# Patient Record
Sex: FEMALE | Race: WHITE | Employment: FULL TIME | URBAN - METROPOLITAN AREA
[De-identification: names, ages, dates, MRNs, and addresses within clinical notes are randomized per-mention and may not be internally consistent; named-entity substitution may affect disease eponyms.]

---

## 2012-08-30 LAB — PAP-ABSTRACT: NORMAL

## 2013-09-20 LAB — PAP-ABSTRACT: NORMAL

## 2014-09-26 LAB — PAP-ABSTRACT: NORMAL

## 2017-01-03 ENCOUNTER — TELEPHONE (OUTPATIENT)
Dept: NURSING | Facility: CLINIC | Age: 47
End: 2017-01-03

## 2017-01-03 NOTE — TELEPHONE ENCOUNTER
"Pt had surgery on 12/27/16: \"Diagnostic laparoscopy, laparoscopic-assisted vaginal hysterectomy, bilateral salpingo-oophorectomy, enterocele repair, fulguration of posterior cul-de-sac endometriosis.\" Pt was told to call in today to report how she was feeling. Pt states she is feeling great expect that her breasts are very tender. Pt had urine test by her doctor in [place] that showed high levels of estrogen but low levels in the blood. Pt is wondering if she should decrease her hormone dosing or if Dr. Churchill had a different recommendation. Note routed to Dr. Churchill to review and advise.   "

## 2017-01-03 NOTE — TELEPHONE ENCOUNTER
The patient is doing well postoperatively. She actually had some breast tenderness even before the surgery and we have asked her to decrease her estrogen dose. We will see her in return in 3 months. She does live in [place] and will see her private physician. Hemoglobin follow-up. Her pathology report being benign and was relayed to her.

## 2017-02-20 DIAGNOSIS — N91.2 ABSENCE OF MENSTRUATION: Primary | ICD-10-CM

## 2017-02-20 NOTE — TELEPHONE ENCOUNTER
12/27/16 Diagnostic laparoscopy, laparoscopic-assisted vaginal hysterectomy, bilateral salpingo-oophorectomy, enterocele repair, fulguration of posterior cul-de-sac endometriosis. Pt is currently living in [place] and wants to know if she really needs to come to the clinic for a 3 month follow-up. Pt stated incision sites are clean and intact, she is not having any vaginal bleeding or pain. Is not taking pain medication. Pt would also like to have a Rx for Estradiol patches.. She is requesting 3 month with 3 refills. Pt stated she was instructed to cut patches in half because of breast tenderness. Informed pt that she does need to come for follow-up. Pt wants message sent to Dr. Churchill to see if that is really necessary.Pt informed that Dr. Churchill is out of the office until next week. OK with waiting until next week. Routing to Dr. Churchill. Please advise and OK to send RX

## 2017-03-02 RX ORDER — ESTRADIOL 0.05 MG/D
0.5 PATCH, EXTENDED RELEASE TRANSDERMAL
Qty: 12 PATCH | Refills: 3 | Status: SHIPPED | OUTPATIENT
Start: 2017-03-02 | End: 2017-12-14

## 2017-03-02 NOTE — TELEPHONE ENCOUNTER
Patient is instructed to call and tell me how she is doing she does not need to come all the way from [place] for 3 month check.

## 2017-03-06 ENCOUNTER — TELEPHONE (OUTPATIENT)
Dept: OBGYN | Facility: CLINIC | Age: 47
End: 2017-03-06

## 2017-03-06 NOTE — TELEPHONE ENCOUNTER
[facility] called with verification on dosage for Estradial. Please call Odessa from [employer] at [phone]. Ref# [id number]

## 2017-03-06 NOTE — TELEPHONE ENCOUNTER
"Ref # is actually: [id number]. Kate from [facility] stated the patch should not be cut in half and wanted to clarify the sig: \"Place 0.5 patches onto the skin twice a week On Monday and Friday.\" Note routed to Floridalma Scanlon to review and advise.   "

## 2017-12-14 ENCOUNTER — RADIANT APPOINTMENT (OUTPATIENT)
Dept: MAMMOGRAPHY | Facility: CLINIC | Age: 47
End: 2017-12-14
Payer: COMMERCIAL

## 2017-12-14 ENCOUNTER — OFFICE VISIT (OUTPATIENT)
Dept: OBGYN | Facility: CLINIC | Age: 47
End: 2017-12-14
Payer: COMMERCIAL

## 2017-12-14 ENCOUNTER — RESULT FOLLOW UP (OUTPATIENT)
Dept: OBGYN | Facility: CLINIC | Age: 47
End: 2017-12-14

## 2017-12-14 VITALS
BODY MASS INDEX: 27.46 KG/M2 | HEIGHT: 63 IN | WEIGHT: 155 LBS | DIASTOLIC BLOOD PRESSURE: 64 MMHG | SYSTOLIC BLOOD PRESSURE: 96 MMHG

## 2017-12-14 DIAGNOSIS — Z90.710 HISTORY OF HYSTERECTOMY INCLUDING CERVIX: ICD-10-CM

## 2017-12-14 DIAGNOSIS — N91.2 ABSENCE OF MENSTRUATION: ICD-10-CM

## 2017-12-14 DIAGNOSIS — Z01.419 ENCOUNTER FOR GYNECOLOGICAL EXAMINATION WITHOUT ABNORMAL FINDING: Primary | ICD-10-CM

## 2017-12-14 DIAGNOSIS — Z12.31 VISIT FOR SCREENING MAMMOGRAM: ICD-10-CM

## 2017-12-14 PROCEDURE — 87624 HPV HI-RISK TYP POOLED RSLT: CPT | Performed by: OBSTETRICS & GYNECOLOGY

## 2017-12-14 PROCEDURE — 77063 BREAST TOMOSYNTHESIS BI: CPT | Mod: TC

## 2017-12-14 PROCEDURE — G0145 SCR C/V CYTO,THINLAYER,RESCR: HCPCS | Performed by: OBSTETRICS & GYNECOLOGY

## 2017-12-14 PROCEDURE — G0124 SCREEN C/V THIN LAYER BY MD: HCPCS | Performed by: OBSTETRICS & GYNECOLOGY

## 2017-12-14 PROCEDURE — 99396 PREV VISIT EST AGE 40-64: CPT | Performed by: OBSTETRICS & GYNECOLOGY

## 2017-12-14 PROCEDURE — G0202 SCR MAMMO BI INCL CAD: HCPCS | Mod: TC

## 2017-12-14 RX ORDER — LIOTHYRONINE SODIUM 5 UG/1
TABLET ORAL
Refills: 1 | COMMUNITY
Start: 2017-11-27 | End: 2019-09-16

## 2017-12-14 RX ORDER — ATOVAQUONE AND PROGUANIL HYDROCHLORIDE 250; 100 MG/1; MG/1
TABLET, FILM COATED ORAL
Refills: 4 | COMMUNITY
Start: 2017-12-02 | End: 2018-06-14

## 2017-12-14 RX ORDER — AZITHROMYCIN 500 MG/1
500 TABLET, FILM COATED ORAL DAILY
COMMUNITY
End: 2018-06-14

## 2017-12-14 RX ORDER — ESZOPICLONE 3 MG/1
TABLET, FILM COATED ORAL
Refills: 4 | COMMUNITY
Start: 2017-11-22 | End: 2017-12-14

## 2017-12-14 RX ORDER — ESTRADIOL 0.05 MG/D
0.5 PATCH, EXTENDED RELEASE TRANSDERMAL
Qty: 12 PATCH | Refills: 3 | Status: SHIPPED | OUTPATIENT
Start: 2017-12-14

## 2017-12-14 RX ORDER — AZITHROMYCIN 500 MG/1
TABLET, FILM COATED ORAL
Refills: 2 | COMMUNITY
Start: 2017-12-04 | End: 2017-12-14

## 2017-12-14 ASSESSMENT — ANXIETY QUESTIONNAIRES
1. FEELING NERVOUS, ANXIOUS, OR ON EDGE: NOT AT ALL
3. WORRYING TOO MUCH ABOUT DIFFERENT THINGS: NOT AT ALL
6. BECOMING EASILY ANNOYED OR IRRITABLE: NOT AT ALL
5. BEING SO RESTLESS THAT IT IS HARD TO SIT STILL: MORE THAN HALF THE DAYS
IF YOU CHECKED OFF ANY PROBLEMS ON THIS QUESTIONNAIRE, HOW DIFFICULT HAVE THESE PROBLEMS MADE IT FOR YOU TO DO YOUR WORK, TAKE CARE OF THINGS AT HOME, OR GET ALONG WITH OTHER PEOPLE: SOMEWHAT DIFFICULT
2. NOT BEING ABLE TO STOP OR CONTROL WORRYING: NOT AT ALL
GAD7 TOTAL SCORE: 5
7. FEELING AFRAID AS IF SOMETHING AWFUL MIGHT HAPPEN: NOT AT ALL

## 2017-12-14 ASSESSMENT — PATIENT HEALTH QUESTIONNAIRE - PHQ9
5. POOR APPETITE OR OVEREATING: NEARLY EVERY DAY
SUM OF ALL RESPONSES TO PHQ QUESTIONS 1-9: 7

## 2017-12-14 NOTE — MR AVS SNAPSHOT
"              After Visit Summary   2017    Cori Gregory    MRN: [id number]           Patient Information     Date Of Birth          1970        Visit Information        Provider Department      2017 3:15 PM Francisco Churchill MD [facility]        Today's Diagnoses     Encounter for gynecological examination without abnormal finding    -  1    Absence of menstruation           Follow-ups after your visit        Who to contact     If you have questions or need follow up information about today's clinic visit or your schedule please contact [facility] directly at [phone].  Normal or non-critical lab and imaging results will be communicated to you by [employer], letter or phone within 4 business days after the clinic has received the results. If you do not hear from us within 7 days, please contact the clinic through [employer] or phone. If you have a critical or abnormal lab result, we will notify you by phone as soon as possible.  Submit refill requests through [employer] or call your pharmacy and they will forward the refill request to us. Please allow 3 business days for your refill to be completed.          Additional Information About Your Visit        MyChart Information     [employer] lets you send messages to your doctor, view your test results, renew your prescriptions, schedule appointments and more. To sign up, go to [email or web address] . Click on \"Log in\" on the left side of the screen, which will take you to the Welcome page. Then click on \"Sign up Now\" on the right side of the page.     You will be asked to enter the access code listed below, as well as some personal information. Please follow the directions to create your username and password.     Your access code is: [email or web address]  Expires: 3/14/2018  4:05 PM     Your access code will  in 90 days. If you need help or a new code, please call your [place] clinic or [phone].      " "  Care EveryWhere ID     This is your Care EveryWhere ID. This could be used by other organizations to access your [facility] medical records  [id number]        Your Vitals Were     Height Breastfeeding? BMI (Body Mass Index)             5' 3\" (1.6 m) No 27.46 kg/m2          Blood Pressure from Last 3 Encounters:   12/14/17 96/64   12/28/16 109/77   12/05/16 98/60    Weight from Last 3 Encounters:   12/14/17 155 lb (70.3 kg)   12/27/16 153 lb 11.2 oz (69.7 kg)   12/05/16 148 lb (67.1 kg)              We Performed the Following     HPV High Risk Types DNA Cervical     Pap imaged thin layer screen with HPV - recommended age 30 - 65          Where to get your medicines      These medications were sent to [id number] - [place], [place] - [address] AT [facility]  [address], [place] [facility] [address] [address]     Phone:  [phone]     estradiol 0.05 MG/24HR BIW patch          Primary Care Provider Office Phone # Fax #    Bahvana Che [phone]       [facility]           [address] [address]        Equal Access to Services     PARAG KELLY AH: Hadii aad ku hadasho Soomaali, waaxda luqadaha, qaybta kaalmada adeegyada, waxay idiin hayaan ofe duarte la'jenn ah. So [facility] [phone].    ATENCIÓN: Si habla español, tiene a nieves disposición servicios gratuitos de asistencia lingüística. Llame al [phone].    We comply with applicable federal civil rights laws and [place] laws. We do not discriminate on the basis of race, color, national origin, age, disability, sex, sexual orientation, or gender identity.            Thank you!     Thank you for choosing [facility]  for your care. Our goal is always to provide you with excellent care. Hearing back from our patients is one way we can continue to improve our services. Please take a few minutes to complete the written survey that you may receive " "in the mail after your visit with us. Thank you!             Your Updated Medication List - Protect others around you: Learn how to safely use, store and throw away your medicines at www.disposemymeds.org.          This list is accurate as of: 12/14/17  4:05 PM.  Always use your most recent med list.                   Brand Name Dispense Instructions for use Diagnosis    amoxicillin-clavulanate 875-125 MG per tablet    AUGMENTIN     Take 1 tablet by mouth 2 times daily        azithromycin 500 MG tablet    ZITHROMAX     Take 500 mg by mouth daily        B-D LUER-NABOR SYRINGE 25G X 1\" 3 ML Misc   Generic drug:  syringe/needle (disp)           CALCIUM PO      Take 1,200 mg by mouth every morning        CITRUCEL PO      Take 2 Tablespoonful by mouth 2 times daily        EFFEXOR XR PO      Take 37.5 mg by mouth 2 times daily        estradiol 0.05 MG/24HR BIW patch    VIVELLE-DOT    12 patch    Place 0.5 patches onto the skin twice a week On Monday and Friday.    Absence of menstruation       liothyronine 5 MCG tablet    CYTOMEL     TK 2 TS PO BID        LUTEIN PO      Take 20 mg by mouth every morning        MAGNESIUM PO      Take 800 mg by mouth At Bedtime        MALARONE 250-100 MG per tablet   Generic drug:  atovaquone-proguanil      TK 2 TS PO BID        NATURE-THROID PO      Take 0.75 g by mouth every morning        * NONFORMULARY      Inject 1,000 mcg/mL as directed every 30 days Vit. B 12 (Methylcobalamin) cannot absorb cyanocobalamin.        * NONFORMULARY      Take 1 tablet by mouth every morning Iodide-Iodine 3.75-2.5mg        OMEGA-3 FISH OIL PO      Take 1,200 g by mouth 2 times daily        PROBIOTIC PO      Take 1 capsule by mouth every morning        valACYclovir 1000 mg tablet    VALTREX     Take 2,000 mg by mouth 2 times daily as needed (for 1 day at onset for cold sores.)        vitamin B complex with vitamin C Tabs tablet      Take 1 tablet by mouth daily        VITAMIN C PO      Take 1,000 mg by mouth " daily        VITAMIN D3 PO      Take 5,000 Units by mouth daily        * Notice:  This list has 2 medication(s) that are the same as other medications prescribed for you. Read the directions carefully, and ask your doctor or other care provider to review them with you.

## 2017-12-14 NOTE — LETTER
December 22, 2017      Cori Gregory  [address]  [facility] [address]    Dear ,     We have recently received your PAP smear results and they were not normal.  Your results came back as low grade squamous intraepithelial lesion (LGSIL). This is a mild abnormality.    Your vaginal sample was also tested for the presence of Human Papillomavirus (HPV). Your HPV test is NEGATIVE for high risk HPV, meaning that no HPV was found at this time.     Please return to clinic in 6 months for a repeat pap smear.     If you have additional questions regarding this result, please call the pap nurse at [phone].    Sincerely,      Francisco Churchill MD/STEFANIE RN

## 2017-12-14 NOTE — PROGRESS NOTES
Cori is a 46 year old No obstetric history on file. female who presents for annual exam.     Besides routine health maintenance, she has no other health concerns today .    HPI: The patient is seen for annual exam. She is being treated for undiagnosed Lyme disease for over the last 5 years. She has some neurologic sequelae. She is still using her Vivelle  The patient's PCP is Doctor in [place]      GYNECOLOGIC HISTORY:    No LMP recorded. Patient has had a hysterectomy.  Her current contraception method is: hysterectomy.  She  reports that she has never smoked. She has never used smokeless tobacco.      Patient is not sexually active.  STD testing offered?  Declined  Last PHQ-9 score on record =   PHQ-9 SCORE 12/14/2017   Total Score 7     Last GAD7 score on record =   GIDEON-7 SCORE 12/14/2017   Total Score 5     Alcohol Score = 1    HEALTH MAINTENANCE:  Cholesterol: followed by PCP  Last Mammo: one year ago, Result: normal, Next Mammo: today  Pap:   Lab Results   Component Value Date    PAP NIL 12/05/2016    PAP NIL 11/10/2015   Colonoscopy:  Never, Result: not applicable, Next Colonoscopy: Age 50  Dexa: 11/2015    Health maintenance updated:  yes    HISTORY:  Obstetric History     No data available          Patient Active Problem List   Diagnosis     Breast pain     Myoma     Past Surgical History:   Procedure Laterality Date     CARDIAC SURGERY      pacemaker     COLONOSCOPY  2007     D & C  2009     HYSTEROSCOPY, LAPAROSCOPY, COMBINED  2006    septum resection     LAPAROSCOPIC ASSISTED HYSTERECTOMY VAGINAL, BILATERAL SALPINGO-OOPHORECTOMY, COMBINED N/A 12/27/2016    Procedure: COMBINED LAPAROSCOPIC ASSISTED HYSTERECTOMY VAGINAL, SALPINGO-OOPHORECTOMY;  Surgeon: Francisco Churchill MD;  Location: [facility]     LAPAROSCOPY  2000    minor endo     LEEP TX, CERVICAL  1994     SINUS SURGERY  2011      Social History   Substance Use Topics     Smoking status: Never Smoker     Smokeless tobacco: Never Used     Alcohol use  "0.0 oz/week     0 Standard drinks or equivalent per week      Comment: occ      Problem (# of Occurrences) Relation (Name,Age of Onset)    Unknown/Adopted (10) Mother, Father, Maternal Grandmother, Maternal Grandfather, Paternal Grandmother, Paternal Grandfather, Brother, Sister, Son, Daughter            Current Outpatient Prescriptions   Medication Sig     MALARONE 250-100 MG per tablet TK 2 TS PO BID     liothyronine (CYTOMEL) 5 MCG tablet TK 2 TS PO BID     amoxicillin-clavulanate (AUGMENTIN) 875-125 MG per tablet Take 1 tablet by mouth 2 times daily     estradiol (VIVELLE-DOT) 0.05 MG/24HR BIW patch Place 0.5 patches onto the skin twice a week On Monday and Friday.     Venlafaxine HCl (EFFEXOR XR PO) Take 37.5 mg by mouth 2 times daily     NONFORMULARY Inject 1,000 mcg/mL as directed every 30 days Vit. B 12 (Methylcobalamin) cannot absorb cyanocobalamin.     Thyroid (NATURE-THROID PO) Take 0.75 g by mouth every morning     Omega-3 Fatty Acids (OMEGA-3 FISH OIL PO) Take 1,200 g by mouth 2 times daily      Ascorbic Acid (VITAMIN C PO) Take 1,000 mg by mouth daily     Cholecalciferol (VITAMIN D3 PO) Take 5,000 Units by mouth daily     CALCIUM PO Take 1,200 mg by mouth every morning     MAGNESIUM PO Take 800 mg by mouth At Bedtime     vitamin B complex with vitamin C (VITAMIN  B COMPLEX) TABS tablet Take 1 tablet by mouth daily     LUTEIN PO Take 20 mg by mouth every morning     NONFORMULARY Take 1 tablet by mouth every morning Iodide-Iodine 3.75-2.5mg     Probiotic Product (PROBIOTIC PO) Take 1 capsule by mouth every morning     Methylcellulose, Laxative, (CITRUCEL PO) Take 2 Tablespoonful by mouth 2 times daily     B-D LUER-NABOR SYRINGE 25G X 1\" 3 ML MISC      valACYclovir (VALTREX) 1000 mg tablet Take 2,000 mg by mouth 2 times daily as needed (for 1 day at onset for cold sores.)      No current facility-administered medications for this visit.      Allergies   Allergen Reactions     Florinef [Fludrocortisone] " Swelling            Levaquin [Levofloxacin] Itching and Swelling     Swelling lips and face       Norpace [Disopyramide] Swelling       Past medical, surgical, social and family histories were reviewed and updated in EPIC.    ROS:   12 point review of systems negative other than symptoms noted below.  Constitutional: Fatigue and Weight Gain  Cardiovascular: Lower Extremity Swelling and Palpitations  Gastrointestinal: Bloating and Nausea  Skin: Skin Dryness  Neurologic: Headaches and Tremors  Musculoskeletal: Muscular Weakness  Blood/Lymph: Easy Bleeding    EXAM:  Breastfeeding? No   BMI: There is no height or weight on file to calculate BMI.    PHYSICAL EXAM:  Constitutional:  Appearance: Well nourished, well developed, alert, in no acute distress  Neck:  Lymph Nodes:  No lymphadenopathy present    Thyroid:  Gland size normal, nontender, no nodules or masses present  on palpation  Chest:  Respiratory Effort:  Breathing unlabored  Cardiovascular:    Heart: Auscultation:  Regular rate, normal rhythm, no murmurs present  Breasts: Inspection of Breasts:  No lymphadenopathy present., Palpation of Breasts and Axillae:  No masses present on palpation, no breast tenderness., Axillary Lymph Nodes:  No lymphadenopathy present. and No nodularity, asymmetry or nipple discharge bilaterally.  Gastrointestinal:   Abdominal Examination:  Abdomen nontender to palpation, tone normal without rigidity or guarding, no masses present, umbilicus without lesions   Liver and Spleen:  No hepatomegaly present, liver nontender to palpation    Hernias:  No hernias present  Lymphatic: Lymph Nodes:  No other lymphadenopathy present  Skin:  General Inspection:  No rashes present, no lesions present, no areas of  discoloration    Genitalia and Groin:  No rashes present, no lesions present, no areas of  discoloration, no masses present  Neurologic/Psychiatric:    Mental Status:  Oriented X3     Pelvic Exam:  External Genitalia:     Normal appearance  for age, no discharge present, no tenderness present, no inflammatory lesions present, color normal  Vagina:     Normal vaginal vault without central or paravaginal defects, no discharge present, no inflammatory lesions present, no masses present  Bladder:     Nontender to palpation  Urethra:   Urethral Body:  Urethra palpation normal, urethra structural support normal   Urethral Meatus:  No erythema or lesions present  Cervix:     Surgically absent  Uterus:     Surgically absent  Adnexa:     Surgically absent  Perineum:     Perineum within normal limits, no evidence of trauma, no rashes or skin lesions present  Anus:     Anus within normal limits, no hemorrhoids present  Inguinal Lymph Nodes:     No lymphadenopathy present      COUNSELING:   Reviewed preventive health counseling, as reflected in patient instructions       Regular exercise       Healthy diet/nutrition    BMI: There is no height or weight on file to calculate BMI.        ASSESSMENT:  46 year old female with satisfactory annual exam.  ICD-10-CM    1. Encounter for gynecological examination without abnormal finding Z01.419 Pap imaged thin layer screen with HPV - recommended age 30 - 65     HPV High Risk Types DNA Cervical       PLAN: We will contact the patient with her Pap and mammogram results.      Francisco Churchill MD

## 2017-12-14 NOTE — LETTER
May 31, 2019      Cori Gregory  [address] [place] [address]    Dear ,      This letter is to remind you that you are due for your follow up PAP smear on or about 06/14/19.    Please call [phone] to schedule your appointment at your earliest convenience.     If you have completed the tests outside of [place], please have the results forwarded to our office. We will update the chart for your primary Physician to review before your next annual physical.     Sincerely,      Your [place] Care Team/[facility]

## 2017-12-15 ASSESSMENT — ANXIETY QUESTIONNAIRES: GAD7 TOTAL SCORE: 5

## 2017-12-19 LAB
COPATH REPORT: ABNORMAL
PAP: ABNORMAL

## 2017-12-21 LAB
FINAL DIAGNOSIS: NORMAL
HPV HR 12 DNA CVX QL NAA+PROBE: NEGATIVE
HPV16 DNA SPEC QL NAA+PROBE: NEGATIVE
HPV18 DNA SPEC QL NAA+PROBE: NEGATIVE
SPECIMEN DESCRIPTION: NORMAL

## 2017-12-22 NOTE — PROGRESS NOTES
12/27/16 hysterectomy including cervix.  Benign pathology  12/14/17 LSIL/Neg HPV of vaginal cuff. Plan: pap in 6 months  6/14/18 NIL vaginal pap/Neg HPV. Plan: vaginal pap in 1 year ([facility])  05/31/19 Pap reminder letter sent. ([facility])  06/24/19 [facility] clinic and schedule. ([facility])  07/22/19 Patient is lost to pap tracking follow-up. FYI routed to provider. ([facility])

## 2018-01-10 ENCOUNTER — TELEPHONE (OUTPATIENT)
Dept: OBGYN | Facility: CLINIC | Age: 48
End: 2018-01-10

## 2018-01-10 NOTE — TELEPHONE ENCOUNTER
Reason for Call:  Other call back    Detailed comments: Please call patient re; when Dr. Churchill wants to see her back for a repeat pap, patient is switching Ins and trying to decide what plan to pick?    Phone Number Patient can be reached at: Cell number on file:    Telephone Information:   Mobile [phone]       Best Time: today    Can we leave a detailed message on this number? YES    Call taken on 1/10/2018 at 12:36 PM by Zohra Tipton

## 2018-06-14 ENCOUNTER — OFFICE VISIT (OUTPATIENT)
Dept: OBGYN | Facility: CLINIC | Age: 48
End: 2018-06-14
Payer: COMMERCIAL

## 2018-06-14 VITALS
DIASTOLIC BLOOD PRESSURE: 60 MMHG | HEART RATE: 68 BPM | WEIGHT: 152 LBS | BODY MASS INDEX: 26.93 KG/M2 | HEIGHT: 63 IN | SYSTOLIC BLOOD PRESSURE: 104 MMHG

## 2018-06-14 DIAGNOSIS — R87.622 LGSIL PAP SMEAR OF VAGINA: Primary | ICD-10-CM

## 2018-06-14 PROCEDURE — 88175 CYTOPATH C/V AUTO FLUID REDO: CPT | Performed by: OBSTETRICS & GYNECOLOGY

## 2018-06-14 PROCEDURE — 99212 OFFICE O/P EST SF 10 MIN: CPT | Performed by: OBSTETRICS & GYNECOLOGY

## 2018-06-14 PROCEDURE — 87624 HPV HI-RISK TYP POOLED RSLT: CPT | Performed by: OBSTETRICS & GYNECOLOGY

## 2018-06-14 RX ORDER — TINIDAZOLE 250 MG/1
TABLET ORAL
Refills: 0 | COMMUNITY
Start: 2018-05-30 | End: 2019-09-16

## 2018-06-14 RX ORDER — SERTRALINE HYDROCHLORIDE 100 MG/1
TABLET, FILM COATED ORAL
Refills: 3 | Status: ON HOLD | COMMUNITY
Start: 2018-06-08 | End: 2019-11-12

## 2018-06-14 RX ORDER — LEVOTHYROXINE SODIUM 100 UG/1
TABLET ORAL
Refills: 3 | COMMUNITY
Start: 2018-06-07

## 2018-06-14 RX ORDER — OXYBUTYNIN CHLORIDE 5 MG/1
TABLET, EXTENDED RELEASE ORAL
Refills: 0 | COMMUNITY
Start: 2018-06-07 | End: 2019-09-16

## 2018-06-14 NOTE — PROGRESS NOTES
SUBJECTIVE:                                                   Cori Gregory is a 47 year old female who presents to clinic today for the following health issue(s):  Patient presents with:  Repeat Pap Smear: 6 month f/u to LSIL pap      HPI: Patient is seen at this time for evaluation of an abnormal Pap.  She has had a previous lap assisted vaginal hysterectomy with BSO.  A Pap smear performed at the end of 2017 showed VAIN 1.  She returns for repeat Pap smear at this time.  She has no current complaints.      No LMP recorded. Patient has had a hysterectomy..   Patient is not sexually active, No obstetric history on file..  Using not sexually active for contraception.    reports that she has never smoked. She has never used smokeless tobacco.    STD testing offered?  Declined    Health maintenance updated:  yes      Problem list and histories reviewed & adjusted, as indicated.  Additional history: as documented.    Patient Active Problem List   Diagnosis     Breast pain     Myoma     History of hysterectomy including cervix     Past Surgical History:   Procedure Laterality Date     CARDIAC SURGERY      pacemaker     COLONOSCOPY  2007     D & C  2009     HYSTEROSCOPY, LAPAROSCOPY, COMBINED  2006    septum resection     LAPAROSCOPIC ASSISTED HYSTERECTOMY VAGINAL, BILATERAL SALPINGO-OOPHORECTOMY, COMBINED N/A 12/27/2016    Procedure: COMBINED LAPAROSCOPIC ASSISTED HYSTERECTOMY VAGINAL, SALPINGO-OOPHORECTOMY;  Surgeon: Francisco Churchill MD;  Location: [facility]     LAPAROSCOPY  2000    minor endo     LEEP TX, CERVICAL  1994     SINUS SURGERY  2011      Social History   Substance Use Topics     Smoking status: Never Smoker     Smokeless tobacco: Never Used     Alcohol use 0.0 oz/week     0 Standard drinks or equivalent per week      Comment: occ      Problem (# of Occurrences) Relation (Name,Age of Onset)    Unknown/Adopted (10) Mother, Father, Maternal Grandmother, Maternal Grandfather, Paternal Grandmother, Paternal  "Grandfather, Brother, Sister, Son, Daughter            Current Outpatient Prescriptions   Medication Sig     amoxicillin-clavulanate (AUGMENTIN) 875-125 MG per tablet Take 1 tablet by mouth 2 times daily     Ascorbic Acid (VITAMIN C PO) Take 1,000 mg by mouth daily     azithromycin (ZITHROMAX) 500 MG tablet Take 500 mg by mouth daily     B-D LUER-NABOR SYRINGE 25G X 1\" 3 ML MISC      CALCIUM PO Take 1,200 mg by mouth every morning     Cholecalciferol (VITAMIN D3 PO) Take 5,000 Units by mouth daily     estradiol (VIVELLE-DOT) 0.05 MG/24HR BIW patch Place 0.5 patches onto the skin twice a week On Monday and Friday.     liothyronine (CYTOMEL) 5 MCG tablet TK 2 TS PO BID     LUTEIN PO Take 20 mg by mouth every morning     MAGNESIUM PO Take 800 mg by mouth At Bedtime     MALARONE 250-100 MG per tablet TK 2 TS PO BID     Methylcellulose, Laxative, (CITRUCEL PO) Take 2 Tablespoonful by mouth 2 times daily     NONFORMULARY Inject 1,000 mcg/mL as directed every 30 days Vit. B 12 (Methylcobalamin) cannot absorb cyanocobalamin.     NONFORMULARY Take 1 tablet by mouth every morning Iodide-Iodine 3.75-2.5mg     Omega-3 Fatty Acids (OMEGA-3 FISH OIL PO) Take 1,200 g by mouth 2 times daily      Probiotic Product (PROBIOTIC PO) Take 1 capsule by mouth every morning     Thyroid (NATURE-THROID PO) Take 0.75 g by mouth every morning     valACYclovir (VALTREX) 1000 mg tablet Take 2,000 mg by mouth 2 times daily as needed (for 1 day at onset for cold sores.)      Venlafaxine HCl (EFFEXOR XR PO) Take 37.5 mg by mouth 2 times daily     vitamin B complex with vitamin C (VITAMIN  B COMPLEX) TABS tablet Take 1 tablet by mouth daily     No current facility-administered medications for this visit.      Allergies   Allergen Reactions     Florinef [Fludrocortisone] Swelling            Levaquin [Levofloxacin] Itching and Swelling     Swelling lips and face       Norpace [Disopyramide] Swelling       ROS:  12 point review of systems negative other " than symptoms noted below.  Constitutional: Fatigue and Weight Gain  Eyes: Spots  Cardiovascular: Lower Extremity Swelling  Gastrointestinal: Bloating  Genitourinary: No Periods and Urgency  Musculoskeletal: Joint Pain and Muscular Weakness  Blood/Lymph: Easy Bleeding    OBJECTIVE:     There were no vitals taken for this visit.  There is no height or weight on file to calculate BMI.    Exam:  Constitutional:  Appearance: Well nourished, well developed alert, in no acute distress  Gastrointestinal:  Abdominal Examination:  Abdomen nontender to palpation, tone normal without rigidity or guarding, no masses present, umbilicus without lesions; Liver/Spleen:  No hepatomegaly present, liver nontender to palpation; Hernias:  No hernias present  Lymphatic: Lymph Nodes:  No other lymphadenopathy present  Skin:General Inspection:  No rashes present, no lesions present, no areas of discoloration; Genitalia and Groin:  No rashes present, no lesions present, no areas of discoloration, no masses present.  Neurologic/Psychiatric:  Mental Status:  Oriented X3   Pelvic Exam:  External Genitalia:     Normal appearance for age, no discharge present, no tenderness present, no inflammatory lesions present, color normal  Vagina:     Normal vaginal vault without central or paravaginal defects, no discharge present, no inflammatory lesions present, no masses present  Bladder:     Nontender to palpation  Urethra:   Urethral Body:  Urethra palpation normal, urethra structural support normal   Urethral Meatus:  No erythema or lesions present  Cervix:     Surgically absent  Uterus:     Surgically absent  Adnexa:     Surgically absent  Perineum:     Perineum within normal limits, no evidence of trauma, no rashes or skin lesions present  Anus:     Anus within normal limits, no hemorrhoids present  Inguinal Lymph Nodes:     No lymphadenopathy present     In-Clinic Test Results:      ASSESSMENT/PLAN:                                                         ICD-10-CM    1. LGSIL Pap smear of vagina R87.622 PAP imaged thin layer, diagnostic     HPV High Risk Types DNA Cervical     Patient with history of mild dysplasia of the vagina seen for repeat Pap smear at this time.  We will contact her with results.        Francisco Churchill MD  [facility] [place]

## 2018-06-14 NOTE — MR AVS SNAPSHOT
"              After Visit Summary   6/14/2018    Cori Gregory    MRN: [id number]           Patient Information     Date Of Birth          1970        Visit Information        Provider Department      6/14/2018 3:45 PM Francisco Churchill MD [facility]        Today's Diagnoses     LGSIL Pap smear of vagina    -  1       Follow-ups after your visit        Your next 10 appointments already scheduled     Jun 14, 2018  3:45 PM CDT   SHORT with Francisco Churchill MD   [facility]    [address] [place] [id number] [phone]              Who to contact     If you have questions or need follow up information about today's clinic visit or your schedule please contact [facility] directly at [phone].  Normal or non-critical lab and imaging results will be communicated to you by MyChart, letter or phone within 4 business days after the clinic has received the results. If you do not hear from us within 7 days, please contact the clinic through MyChart or phone. If you have a critical or abnormal lab result, we will notify you by phone as soon as possible.  Submit refill requests through [employer] or call your pharmacy and they will forward the refill request to us. Please allow 3 business days for your refill to be completed.          Additional Information About Your Visit        MyChart Information     [employer] lets you send messages to your doctor, view your test results, renew your prescriptions, schedule appointments and more. To sign up, go to [email or web address] . Click on \"Log in\" on the left side of the screen, which will take you to the Welcome page. Then click on \"Sign up Now\" on the right side of the page.     You will be asked to enter the access code listed below, as well as some personal information. Please follow the directions to create your username and password.     Your " "access code is: [email or web address]  Expires: 2018  2:33 PM     Your access code will  in 90 days. If you need help or a new code, please call your [facility] clinic or [phone].        Care EveryWhere ID     This is your Care EveryWhere ID. This could be used by other organizations to access your [facility] medical records  [id number]        Your Vitals Were     Pulse Height BMI (Body Mass Index)             68 5' 3\" (1.6 m) 26.93 kg/m2          Blood Pressure from Last 3 Encounters:   18 104/60   17 96/64   16 109/77    Weight from Last 3 Encounters:   18 152 lb (68.9 kg)   17 155 lb (70.3 kg)   16 153 lb 11.2 oz (69.7 kg)              We Performed the Following     HPV High Risk Types DNA Cervical     PAP imaged thin layer, diagnostic        Primary Care Provider Office Phone # Fax #    Bhavana Che [phone]       [facility]           [address] [place] [address]        Equal Access to Services     [facility]: Hadii aad ku hadasho Soomaali, waaxda luqadaha, qaybta kaalmada adeegyada, sreekanth chicasn ofe damico . So [facility] [phone].    ATENCIÓN: Si habla español, tiene a nieves disposición servicios gratuitos de asistencia lingüística. Ll[facility] [phone].    We comply with applicable federal civil rights laws and [place] laws. We do not discriminate on the basis of race, color, national origin, age, disability, sex, sexual orientation, or gender identity.            Thank you!     Thank you for choosing [facility]  for your care. Our goal is always to provide you with excellent care. Hearing back from our patients is one way we can continue to improve our services. Please take a few minutes to complete the written survey that you may receive in the mail after your visit with us. Thank you!             Your Updated Medication List - Protect others around you: Learn how to " "safely use, store and throw away your medicines at www.disposemymeds.org.          This list is accurate as of 6/14/18  3:41 PM.  Always use your most recent med list.                   Brand Name Dispense Instructions for use Diagnosis    B-D LUER-NABOR SYRINGE 25G X 1\" 3 ML Misc   Generic drug:  syringe/needle (disp)           CALCIUM PO      Take 1,200 mg by mouth every morning        estradiol 0.05 MG/24HR BIW patch    VIVELLE-DOT    12 patch    Place 0.5 patches onto the skin twice a week On Monday and Friday.    Absence of menstruation       levothyroxine 100 MCG tablet    SYNTHROID/LEVOTHROID     TK 1 T PO QD        liothyronine 5 MCG tablet    CYTOMEL     TK 2 TS PO BID        LUTEIN PO      Take 20 mg by mouth every morning        MAGNESIUM PO      Take 800 mg by mouth At Bedtime        * NONFORMULARY      Inject 1,000 mcg/mL as directed every 30 days Vit. B 12 (Methylcobalamin) cannot absorb cyanocobalamin.        * NONFORMULARY      Take 1 tablet by mouth every morning Iodide-Iodine 3.75-2.5mg        OMEGA-3 FISH OIL PO      Take 1,200 g by mouth 2 times daily        oxybutynin 5 MG 24 hr tablet    DITROPAN-XL          PROBIOTIC PO      Take 1 capsule by mouth every morning        sertraline 100 MG tablet    ZOLOFT     TK 1 T PO QD        Tinidazole 250 MG Tabs      TK 1 T PO  BID UNTIL FINISHED        valACYclovir 1000 mg tablet    VALTREX     Take 2,000 mg by mouth 2 times daily as needed (for 1 day at onset for cold sores.)        vitamin B complex with vitamin C Tabs tablet      Take 1 tablet by mouth daily        VITAMIN C PO      Take 1,000 mg by mouth daily        VITAMIN D3 PO      Take 5,000 Units by mouth daily        * Notice:  This list has 2 medication(s) that are the same as other medications prescribed for you. Read the directions carefully, and ask your doctor or other care provider to review them with you.      "

## 2018-06-14 NOTE — LETTER
June 27, 2018    Cori Gregory  [address] [place] [id number]    Dear Cori,  We are happy to inform you that your PAP smear result from 6/14/18 is normal.  We are now able to do a follow up test on PAP smears. The DNA test is for HPV (Human Papilloma Virus). Cervical cancer is closely linked with certain types of HPV. Your results showed no evidence of high risk HPV.  Therefore we recommend you return in 1 year for your next vaginal pap smear.  You will still need to return to the clinic every year for an annual exam and other preventive tests.  Please contact the clinic at [phone] with any questions.  Sincerely,    Francisco Churchill MD/onesimo

## 2018-06-19 LAB
COPATH REPORT: NORMAL
PAP: NORMAL

## 2018-06-20 LAB
FINAL DIAGNOSIS: NORMAL
HPV HR 12 DNA CVX QL NAA+PROBE: NEGATIVE
HPV16 DNA SPEC QL NAA+PROBE: NEGATIVE
HPV18 DNA SPEC QL NAA+PROBE: NEGATIVE
SPECIMEN DESCRIPTION: NORMAL
SPECIMEN SOURCE CVX/VAG CYTO: NORMAL

## 2018-06-26 ENCOUNTER — TELEPHONE (OUTPATIENT)
Dept: OBGYN | Facility: CLINIC | Age: 48
End: 2018-06-26

## 2018-06-27 NOTE — TELEPHONE ENCOUNTER
"Contacted the pt. Pt aware that Dr Churchill has not yet reviewed the results.  Pap and HPV results given as negative. Pt questions if she has had a hysterectomy- Why does she still need the pap- and why did the letter she got referr to cervical cells.  Explained to the pt that during a hysterectomy a \" Cervical Cuff\" is left to maintain integrity of vagina and sexual gratification. No further questions.   "

## 2019-06-24 ENCOUNTER — TELEPHONE (OUTPATIENT)
Dept: OBGYN | Facility: CLINIC | Age: 49
End: 2019-06-24

## 2019-06-24 NOTE — TELEPHONE ENCOUNTER
Pt is past due for f/u pap smear.  [facility] clinic and schedule.    Madeleine Farmer  Pap Tracking

## 2019-09-13 NOTE — PROGRESS NOTES
Cori is a 48 year old No obstetric history on file. female who presents for annual exam.     Besides routine health maintenance, she has no other health concerns today .    HPI: The patient is seen at this time for her annual exam.  Her health history has been complicated by her chronic Lyme disease.  She is still on primaquine and another antibiotic as well as some alternative holistic treatments.  She sees an infectious disease specialist from [place].  The patient's PCP is Eric Clarke-located in [place]        GYNECOLOGIC HISTORY:    No LMP recorded. Patient has had a hysterectomy.  Her current contraception method is: hysterectomy.  She  reports that she has never smoked. She has never used smokeless tobacco.    Patient is not sexually active.  STD testing offered?  Declined  Last PHQ-9 score on record =   PHQ-9 SCORE 2019   PHQ-9 Total Score 5     Last GAD7 score on record =   GIDEON-7 SCORE 2019   Total Score 6     Alcohol Score = 1    HEALTH MAINTENANCE:  Cholesterol: (No results found for: CHOL   Last Mammo: one year ago, Result: normal, Next Mammo: today   Pap: (  Lab Results   Component Value Date    PAP NIL 2018    PAP LSIL 2017    PAP NIL 2016 WNL HPV(-)neg  Colonoscopy:  2017, Result: normal, Next Colonoscopy: age 50.  Dexa:  17    Health maintenance updated:  yes    HISTORY:  OB History    Para Term  AB Living   0 0 0 0 0 0   SAB TAB Ectopic Multiple Live Births   0 0 0 0 0       Patient Active Problem List   Diagnosis     Breast pain     Myoma     History of hysterectomy including cervix     Past Surgical History:   Procedure Laterality Date     CARDIAC SURGERY      pacemaker     COLONOSCOPY       D & C       HYSTEROSCOPY, LAPAROSCOPY, COMBINED      septum resection     LAPAROSCOPIC ASSISTED HYSTERECTOMY VAGINAL, BILATERAL SALPINGO-OOPHORECTOMY, COMBINED N/A 2016    Procedure: COMBINED LAPAROSCOPIC ASSISTED HYSTERECTOMY VAGINAL,  SALPINGO-OOPHORECTOMY;  Surgeon: Francisco Churchill MD;  Location: [facility]     LAPAROSCOPY  2000    minor endo     LEEP TX, CERVICAL  1994     SINUS SURGERY  2011      Social History     Tobacco Use     Smoking status: Never Smoker     Smokeless tobacco: Never Used   Substance Use Topics     Alcohol use: Yes     Alcohol/week: 0.0 oz     Comment: occ      *Patient is Adopted       Problem (# of Occurrences) Relation (Name,Age of Onset)    Unknown/Adopted (10) Mother, Father, Maternal Grandmother, Maternal Grandfather, Paternal Grandmother, Paternal Grandfather, Brother, Sister, Son, Daughter            Current Outpatient Medications   Medication Sig     Ascorbic Acid (VITAMIN C PO) Take 1,000 mg by mouth daily     CALCIUM PO Take 1,200 mg by mouth every morning     Cholecalciferol (VITAMIN D3 PO) Take 5,000 Units by mouth daily     estradiol (VIVELLE-DOT) 0.05 MG/24HR BIW patch Place 0.5 patches onto the skin twice a week On Monday and Friday.     levothyroxine (SYNTHROID/LEVOTHROID) 100 MCG tablet TK 1 T PO QD     LUTEIN PO Take 20 mg by mouth every morning     MAGNESIUM PO Take 800 mg by mouth At Bedtime     NONFORMULARY Inject 1,000 mcg/mL as directed every 30 days Vit. B 12 (Methylcobalamin) cannot absorb cyanocobalamin.     Omega-3 Fatty Acids (OMEGA-3 FISH OIL PO) Take 1,200 g by mouth 2 times daily      Probiotic Product (PROBIOTIC PO) Take 1 capsule by mouth every morning     sertraline (ZOLOFT) 100 MG tablet TK 1 T PO QD     valACYclovir (VALTREX) 1000 mg tablet Take 2,000 mg by mouth 2 times daily as needed (for 1 day at onset for cold sores.)      vitamin B complex with vitamin C (VITAMIN  B COMPLEX) TABS tablet Take 1 tablet by mouth daily     meloxicam (MOBIC) 15 MG tablet Take 15 mg by mouth daily     MYRBETRIQ 25 MG 24 hr tablet TAKE 1 TABLET BY MOUTH ONCE DAILY FOR 30 DAYS     primaquine 26.3 MG tablet TAKE 2 TABLETS BY MOUTH ONCE DAILY     rifampin (RIFADIN) 300 MG capsule TAKE 1 CAPSULE BY MOUTH TWICE  "DAILY ON AN EMPTY STOMACH     terbinafine (LAMISIL) 250 MG tablet Take 1 tablet by mouth daily     No current facility-administered medications for this visit.      Allergies   Allergen Reactions     Florinef [Fludrocortisone] Swelling            Levaquin [Levofloxacin] Itching and Swelling     Swelling lips and face       Norpace [Disopyramide] Swelling       Past medical, surgical, social and family histories were reviewed and updated in EPIC.    ROS:   12 point review of systems negative other than symptoms noted below.    EXAM:  /68   Ht 1.613 m (5' 3.5\")   Wt 66.7 kg (147 lb)   Breastfeeding? No   BMI 25.63 kg/m     BMI: Body mass index is 25.63 kg/m .    PHYSICAL EXAM:  Constitutional:  Appearance: Well nourished, well developed, alert, in no acute distress  Neck:  Lymph Nodes:  No lymphadenopathy present    Thyroid:  Gland size normal, nontender, no nodules or masses present  on palpation  Chest:  Respiratory Effort:  Breathing unlabored  Cardiovascular:    Heart: Auscultation:  Regular rate, normal rhythm, no murmurs present  Breasts: Inspection of Breasts:  No lymphadenopathy present., Palpation of Breasts and Axillae:  No masses present on palpation, no breast tenderness., Axillary Lymph Nodes:  No lymphadenopathy present. and No nodularity, asymmetry or nipple discharge bilaterally.  Gastrointestinal:   Abdominal Examination:  Abdomen nontender to palpation, tone normal without rigidity or guarding, no masses present, umbilicus without lesions   Liver and Spleen:  No hepatomegaly present, liver nontender to palpation    Hernias:  No hernias present  Lymphatic: Lymph Nodes:  No other lymphadenopathy present  Skin:  General Inspection:  No rashes present, no lesions present, no areas of  discoloration    Genitalia and Groin:  No rashes present, no lesions present, no areas of  discoloration, no masses present  Neurologic/Psychiatric:    Mental Status:  Oriented X3     Pelvic Exam:  External " Genitalia:     Normal appearance for age, no discharge present, no tenderness present, no inflammatory lesions present, color normal  Vagina:     Normal vaginal vault without central or paravaginal defects, no discharge present, no inflammatory lesions present, no masses present  Bladder:     Nontender to palpation  Urethra:   Urethral Body:  Urethra palpation normal, urethra structural support normal   Urethral Meatus:  No erythema or lesions present  Cervix:     Surgically absent  Uterus:     Surgically absent  Adnexa:     Surgically absent  Perineum:     Perineum within normal limits, no evidence of trauma, no rashes or skin lesions present  Anus:     Anus within normal limits, no hemorrhoids present  Inguinal Lymph Nodes:     No lymphadenopathy present    COUNSELING:   Reviewed preventive health counseling, as reflected in patient instructions       Regular exercise       Healthy diet/nutrition    BMI: Body mass index is 25.63 kg/m .  Weight management plan: Discussed healthy diet and exercise guidelines    ASSESSMENT:  48 year old female with satisfactory annual exam.    ICD-10-CM    1. Encounter for gynecological examination without abnormal finding Z01.419 Pap imaged thin layer screen with HPV - recommended age 30 - 65     HPV High Risk Types DNA Cervical       PLAN: Patient with stable general exam.  She is also on Myrbetriq for urgency incontinence.  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      Francisco Churchill MD

## 2019-09-16 ENCOUNTER — ANCILLARY PROCEDURE (OUTPATIENT)
Dept: MAMMOGRAPHY | Facility: CLINIC | Age: 49
End: 2019-09-16
Payer: COMMERCIAL

## 2019-09-16 ENCOUNTER — RESULT FOLLOW UP (OUTPATIENT)
Dept: OBGYN | Facility: CLINIC | Age: 49
End: 2019-09-16

## 2019-09-16 ENCOUNTER — OFFICE VISIT (OUTPATIENT)
Dept: OBGYN | Facility: CLINIC | Age: 49
End: 2019-09-16
Payer: COMMERCIAL

## 2019-09-16 VITALS
BODY MASS INDEX: 25.1 KG/M2 | SYSTOLIC BLOOD PRESSURE: 112 MMHG | HEIGHT: 64 IN | DIASTOLIC BLOOD PRESSURE: 68 MMHG | WEIGHT: 147 LBS

## 2019-09-16 DIAGNOSIS — Z12.31 VISIT FOR SCREENING MAMMOGRAM: ICD-10-CM

## 2019-09-16 DIAGNOSIS — Z01.419 ENCOUNTER FOR GYNECOLOGICAL EXAMINATION WITHOUT ABNORMAL FINDING: Primary | ICD-10-CM

## 2019-09-16 DIAGNOSIS — Z90.710 HISTORY OF HYSTERECTOMY INCLUDING CERVIX: ICD-10-CM

## 2019-09-16 PROCEDURE — 77063 BREAST TOMOSYNTHESIS BI: CPT | Mod: TC

## 2019-09-16 PROCEDURE — 77067 SCR MAMMO BI INCL CAD: CPT | Mod: TC

## 2019-09-16 PROCEDURE — G0124 SCREEN C/V THIN LAYER BY MD: HCPCS | Performed by: OBSTETRICS & GYNECOLOGY

## 2019-09-16 PROCEDURE — 99396 PREV VISIT EST AGE 40-64: CPT | Performed by: OBSTETRICS & GYNECOLOGY

## 2019-09-16 PROCEDURE — 88142 CYTOPATH C/V THIN LAYER: CPT | Performed by: OBSTETRICS & GYNECOLOGY

## 2019-09-16 PROCEDURE — 87624 HPV HI-RISK TYP POOLED RSLT: CPT | Performed by: OBSTETRICS & GYNECOLOGY

## 2019-09-16 RX ORDER — RIFAMPIN 300 MG/1
CAPSULE ORAL
Refills: 1 | Status: ON HOLD | COMMUNITY
Start: 2019-09-05 | End: 2019-11-12

## 2019-09-16 RX ORDER — MELOXICAM 15 MG/1
15 TABLET ORAL DAILY
Refills: 0 | Status: ON HOLD | COMMUNITY
Start: 2019-08-22 | End: 2019-11-12

## 2019-09-16 RX ORDER — MIRABEGRON 25 MG/1
TABLET, FILM COATED, EXTENDED RELEASE ORAL
Refills: 3 | COMMUNITY
Start: 2019-09-05

## 2019-09-16 RX ORDER — TERBINAFINE HYDROCHLORIDE 250 MG/1
1 TABLET ORAL DAILY
Refills: 2 | COMMUNITY
Start: 2019-09-11

## 2019-09-16 ASSESSMENT — MIFFLIN-ST. JEOR: SCORE: 1273.85

## 2019-09-16 ASSESSMENT — ANXIETY QUESTIONNAIRES
7. FEELING AFRAID AS IF SOMETHING AWFUL MIGHT HAPPEN: NOT AT ALL
2. NOT BEING ABLE TO STOP OR CONTROL WORRYING: NOT AT ALL
1. FEELING NERVOUS, ANXIOUS, OR ON EDGE: SEVERAL DAYS
5. BEING SO RESTLESS THAT IT IS HARD TO SIT STILL: MORE THAN HALF THE DAYS
IF YOU CHECKED OFF ANY PROBLEMS ON THIS QUESTIONNAIRE, HOW DIFFICULT HAVE THESE PROBLEMS MADE IT FOR YOU TO DO YOUR WORK, TAKE CARE OF THINGS AT HOME, OR GET ALONG WITH OTHER PEOPLE: NOT DIFFICULT AT ALL
6. BECOMING EASILY ANNOYED OR IRRITABLE: SEVERAL DAYS
3. WORRYING TOO MUCH ABOUT DIFFERENT THINGS: NOT AT ALL
GAD7 TOTAL SCORE: 6

## 2019-09-16 ASSESSMENT — PATIENT HEALTH QUESTIONNAIRE - PHQ9
SUM OF ALL RESPONSES TO PHQ QUESTIONS 1-9: 5
5. POOR APPETITE OR OVEREATING: MORE THAN HALF THE DAYS

## 2019-09-17 ASSESSMENT — ANXIETY QUESTIONNAIRES: GAD7 TOTAL SCORE: 6

## 2019-09-23 LAB
COPATH REPORT: ABNORMAL
PAP: ABNORMAL

## 2019-09-24 NOTE — PROGRESS NOTES
1994 LEEP  12/27/16 hysterectomy including cervix.  Benign pathology  12/14/17 LSIL/Neg HPV of vaginal cuff. Plan: pap in 6 months  6/14/18 NIL vaginal pap/Neg HPV. Plan: vaginal pap in 1 year  07/22/19 Patient is lost to pap tracking follow-up.   9/16/19 LSIL, Neg HPV of vaginal cuff. Plan.Colposcopy  9/25/19 left msg  9/26/19 Advised of result and follow up.  11/12/19 cervical trachelectomy- benign Plan: paps per pt request

## 2019-09-30 ENCOUNTER — TELEPHONE (OUTPATIENT)
Dept: OBGYN | Facility: CLINIC | Age: 49
End: 2019-09-30

## 2019-09-30 ENCOUNTER — PREP FOR PROCEDURE (OUTPATIENT)
Dept: OBGYN | Facility: CLINIC | Age: 49
End: 2019-09-30

## 2019-09-30 DIAGNOSIS — N87.9 CERVICAL DYSPLASIA: Primary | ICD-10-CM

## 2019-10-01 PROBLEM — N87.9 CERVICAL DYSPLASIA: Status: ACTIVE | Noted: 2019-10-01

## 2019-10-01 NOTE — TELEPHONE ENCOUNTER
Type of surgery: CX TRACHELECTOMY  Location of surgery: [facility]  Date and time of surgery: 11/12/2019 8a  Surgeon: Kerline  Pre-Op Appt Date: 11/11/2019  Post-Op Appt Date: TBD   Packet sent out: MAILED 10/1/2019  Pre-cert/Authorization completed:  TBD  Date: 10/1/2019 Timothy owusu/Ana Alvarez  Surgery Scheduler    DX N87.9  CPT 38892

## 2019-11-05 DIAGNOSIS — Z48.816 AFTERCARE FOLLOWING SURGERY OF THE GENITOURINARY SYSTEM: ICD-10-CM

## 2019-11-05 DIAGNOSIS — Z01.812 PRE-OPERATIVE LABORATORY EXAMINATION: ICD-10-CM

## 2019-11-05 DIAGNOSIS — N87.9 CERVICAL DYSPLASIA: Primary | ICD-10-CM

## 2019-11-11 ENCOUNTER — OFFICE VISIT (OUTPATIENT)
Dept: OBGYN | Facility: CLINIC | Age: 49
End: 2019-11-11
Payer: COMMERCIAL

## 2019-11-11 VITALS — DIASTOLIC BLOOD PRESSURE: 60 MMHG | WEIGHT: 146 LBS | SYSTOLIC BLOOD PRESSURE: 108 MMHG | BODY MASS INDEX: 25.46 KG/M2

## 2019-11-11 DIAGNOSIS — N87.9 CERVICAL DYSPLASIA: ICD-10-CM

## 2019-11-11 DIAGNOSIS — Z48.816 AFTERCARE FOLLOWING SURGERY OF THE GENITOURINARY SYSTEM: ICD-10-CM

## 2019-11-11 DIAGNOSIS — Z01.818 PRE-OP EXAM: Primary | ICD-10-CM

## 2019-11-11 LAB — HGB BLD-MCNC: 12.5 G/DL (ref 11.7–15.7)

## 2019-11-11 PROCEDURE — 85018 HEMOGLOBIN: CPT | Performed by: OBSTETRICS & GYNECOLOGY

## 2019-11-11 PROCEDURE — 36415 COLL VENOUS BLD VENIPUNCTURE: CPT | Performed by: OBSTETRICS & GYNECOLOGY

## 2019-11-11 PROCEDURE — 99214 OFFICE O/P EST MOD 30 MIN: CPT | Performed by: OBSTETRICS & GYNECOLOGY

## 2019-11-11 RX ORDER — PHENAZOPYRIDINE HYDROCHLORIDE 200 MG/1
200 TABLET, FILM COATED ORAL ONCE
Status: CANCELLED | OUTPATIENT
Start: 2019-11-11 | End: 2019-11-11

## 2019-11-11 NOTE — H&P
2019  [facility]   Francisco Churchill MD   OB/Gyn   Pre-op exam +1 more   Dx   Pre-Op Exam     Reason for Visit    Addendum Note   Marek Doherty (Technician)      Addended by: MAREK DOHERTY on: 2019 12:10 PM       Modules accepted: Orders        Progress Notes   Francisco Churchill MD (Physician)     OB/Gyn            SUBJECTIVE:                                                   Cori Gregory is a 48 year old female who presents to clinic today for the following health issue(s):  Patient presents with:  Pre-Op Exam: CERVICAL TRACHELECTOMY        HPI: The patient is seen at this time for preoperative clearance for a cervical trachelectomy.  She has had a supracervical hysterectomy but now is having cervical dysplasia issues and requesting removal of the cervix.  We will approach this as a combined laparoscopic and vaginal issue.  She has rare stress incontinence and no deprecatory difficulties.  She is healthy at this time.        No LMP recorded. Patient has had a hysterectomy..      Patient is not sexually active, .  Using hysterectomy for contraception.    reports that she has never smoked. She has never used smokeless tobacco.     STD testing offered?  Declined     Health maintenance updated:  yes     Problem list and histories reviewed & adjusted, as indicated.  Additional history: as documented.         Patient Active Problem List   Diagnosis     Breast pain     Myoma     History of hysterectomy including cervix     Cervical dysplasia             Past Surgical History:   Procedure Laterality Date     CARDIAC SURGERY         pacemaker     COLONOSCOPY        D & C        HYSTEROSCOPY, LAPAROSCOPY, COMBINED   2006     septum resection     LAPAROSCOPIC ASSISTED HYSTERECTOMY VAGINAL, BILATERAL SALPINGO-OOPHORECTOMY, COMBINED N/A 2016     Procedure: COMBINED LAPAROSCOPIC ASSISTED HYSTERECTOMY VAGINAL, SALPINGO-OOPHORECTOMY;  Surgeon: Kerline  Francisco JOHNSON MD;  Location:  [facility]     LAPAROSCOPY   2000     minor endo     LEEP TX, CERVICAL   1994     SINUS SURGERY   2011       Social History            Tobacco Use     Smoking status: Never Smoker     Smokeless tobacco: Never Used   Substance Use Topics     Alcohol use: Yes       Alcohol/week: 0.0 standard drinks       Comment: occ            *Patient is Adopted              Problem (# of Occurrences) Relation (Name,Age of Onset)     Unknown/Adopted (10) Mother, Father, Maternal Grandmother, Maternal Grandfather, Paternal Grandmother, Paternal Grandfather, Brother, Sister, Son, Daughter                   Current Outpatient Medications   Medication Sig     Ascorbic Acid (VITAMIN C PO) Take 1,000 mg by mouth daily     CALCIUM PO Take 1,200 mg by mouth every morning     Cholecalciferol (VITAMIN D3 PO) Take 5,000 Units by mouth daily     estradiol (VIVELLE-DOT) 0.05 MG/24HR BIW patch Place 0.5 patches onto the skin twice a week On Monday and Friday.     levothyroxine (SYNTHROID/LEVOTHROID) 100 MCG tablet TK 1 T PO QD     LUTEIN PO Take 20 mg by mouth every morning     MAGNESIUM PO Take 800 mg by mouth At Bedtime     meloxicam (MOBIC) 15 MG tablet Take 15 mg by mouth daily     MYRBETRIQ 25 MG 24 hr tablet TAKE 1 TABLET BY MOUTH ONCE DAILY FOR 30 DAYS     NONFORMULARY Inject 1,000 mcg/mL as directed every 30 days Vit. B 12 (Methylcobalamin) cannot absorb cyanocobalamin.     Omega-3 Fatty Acids (OMEGA-3 FISH OIL PO) Take 1,200 g by mouth 2 times daily      primaquine 26.3 MG tablet TAKE 2 TABLETS BY MOUTH ONCE DAILY     Probiotic Product (PROBIOTIC PO) Take 1 capsule by mouth every morning     rifampin (RIFADIN) 300 MG capsule TAKE 1 CAPSULE BY MOUTH TWICE DAILY ON AN EMPTY STOMACH     sertraline (ZOLOFT) 100 MG tablet TK 1 T PO QD     terbinafine (LAMISIL) 250 MG tablet Take 1 tablet by mouth daily     valACYclovir (VALTREX) 1000 mg tablet Take 2,000 mg by mouth 2 times daily as needed (for 1 day at onset for cold  sores.)      vitamin B complex with vitamin C (VITAMIN  B COMPLEX) TABS tablet Take 1 tablet by mouth daily      No current facility-administered medications for this visit.             Allergies   Allergen Reactions     Florinef [Fludrocortisone] Swelling                Levaquin [Levofloxacin] Itching and Swelling       Swelling lips and face        Norpace [Disopyramide] Swelling         ROS:  12 point review of systems negative other than symptoms noted below.     OBJECTIVE:      /60   Wt 66.2 kg (146 lb)   Breastfeeding? No   BMI 25.46 kg/m    Body mass index is 25.46 kg/m .     Exam:  Constitutional:  Appearance: Well nourished, well developed alert, in no acute distress  Neck:  Lymph Nodes:  No lymphadenopathy present; Thyroid:  Gland size normal, nontender, no nodules or masses present on palpation  Chest:  Respiratory Effort:  Breathing unlabored. Clear to auscultation bilaterally.   Cardiovascular: Heart: Auscultation:  Regular rate, normal rhythm, no murmurs present  Gastrointestinal:  Abdominal Examination:  Abdomen nontender to palpation, tone normal without rigidity or guarding, no masses present, umbilicus without lesions; Liver/Spleen:  No hepatomegaly present, liver nontender to palpation; Hernias:  No hernias present  Lymphatic: Lymph Nodes:  No other lymphadenopathy present  Skin: General Inspection:  No rashes present, no lesions present, no areas of discoloration.  Neurologic:  Mental Status:  Oriented X3.  Normal strength and tone, sensory exam grossly normal, mentation intact and speech normal.    Psychiatric:  Mentation appears normal and affect normal/bright.  No Pelvic Exam performed      In-Clinic Test Results: Hemoglobin pending        ASSESSMENT/PLAN:                                                          Patient cleared for general anesthesia for laparoscopic approach for cervical trachelectomy.  The patient will be in the hospital one night and off of work for 2 weeks.  She  understands that she will not be able to do any lifting for 6 weeks.  She understands the risks and complications of the procedure including general anesthesia blood loss infection injury to bowel bladder ureters and major vessels necessitating further surgery.           Francisco Churchill MD  [facility]      Instructions      After Visit Summary (Automatic SnapShot taken 11/11/2019)   Additional Documentation     Vitals:    /60    Wt 66.2 kg (146 lb)    Breastfeeding? No    BMI 25.46 kg/m     BSA 1.72 m     Flowsheets:    Vitals Reassessment,    NICU VS,    Anthropometrics,    Lactation       Encounter Info:    Billing Info,    History,    Allergies,    Detailed Report       AVS Reports     Date/Time Report Action User   11/11/2019 12:10 PM After Visit Summary Automatically Generated Francisco Churchill MD   Encounter Information      Provider Department Encounter # [id number]   11/11/2019 11:45 AM Francisco Churchill MD [facility] Ob/Gyn [id number] [facility]   Reviewed this Encounter      Medications Problems Allergies History   Francisco Churchill MD   Reviewed Family, Medical, Surgical, Tobacco   Dyan Arora, DANNY   Reviewed Tobacco   Orders Placed      None   Medication Changes        None      Medication List    Visit Diagnoses         Pre-op exam      Cervical dysplasia      Problem List

## 2019-11-11 NOTE — PROGRESS NOTES
SUBJECTIVE:                                                   Cori Gregory is a 48 year old female who presents to clinic today for the following health issue(s):  Patient presents with:  Pre-Op Exam: CERVICAL TRACHELECTOMY      HPI: The patient is seen at this time for preoperative clearance for a cervical trachelectomy.  She has had a supracervical hysterectomy but now is having cervical dysplasia issues and requesting removal of the cervix.  We will approach this as a combined laparoscopic and vaginal issue.  She has rare stress incontinence and no deprecatory difficulties.  She is healthy at this time.      No LMP recorded. Patient has had a hysterectomy..     Patient is not sexually active, .  Using hysterectomy for contraception.    reports that she has never smoked. She has never used smokeless tobacco.    STD testing offered?  Declined    Health maintenance updated:  yes    Problem list and histories reviewed & adjusted, as indicated.  Additional history: as documented.    Patient Active Problem List   Diagnosis     Breast pain     Myoma     History of hysterectomy including cervix     Cervical dysplasia     Past Surgical History:   Procedure Laterality Date     CARDIAC SURGERY      pacemaker     COLONOSCOPY       D & C       HYSTEROSCOPY, LAPAROSCOPY, COMBINED      septum resection     LAPAROSCOPIC ASSISTED HYSTERECTOMY VAGINAL, BILATERAL SALPINGO-OOPHORECTOMY, COMBINED N/A 2016    Procedure: COMBINED LAPAROSCOPIC ASSISTED HYSTERECTOMY VAGINAL, SALPINGO-OOPHORECTOMY;  Surgeon: Francisco Churchill MD;  [facility]     LAPAROSCOPY      minor endo     LEEP TX, CERVICAL  1994     SINUS SURGERY        Social History     Tobacco Use     Smoking status: Never Smoker     Smokeless tobacco: Never Used   Substance Use Topics     Alcohol use: Yes     Alcohol/week: 0.0 standard drinks     Comment: occ      *Patient is Adopted       Problem (# of Occurrences) Relation (Name,Age  of Onset)    Unknown/Adopted (10) Mother, Father, Maternal Grandmother, Maternal Grandfather, Paternal Grandmother, Paternal Grandfather, Brother, Sister, Son, Daughter            Current Outpatient Medications   Medication Sig     Ascorbic Acid (VITAMIN C PO) Take 1,000 mg by mouth daily     CALCIUM PO Take 1,200 mg by mouth every morning     Cholecalciferol (VITAMIN D3 PO) Take 5,000 Units by mouth daily     estradiol (VIVELLE-DOT) 0.05 MG/24HR BIW patch Place 0.5 patches onto the skin twice a week On Monday and Friday.     levothyroxine (SYNTHROID/LEVOTHROID) 100 MCG tablet TK 1 T PO QD     LUTEIN PO Take 20 mg by mouth every morning     MAGNESIUM PO Take 800 mg by mouth At Bedtime     meloxicam (MOBIC) 15 MG tablet Take 15 mg by mouth daily     MYRBETRIQ 25 MG 24 hr tablet TAKE 1 TABLET BY MOUTH ONCE DAILY FOR 30 DAYS     NONFORMULARY Inject 1,000 mcg/mL as directed every 30 days Vit. B 12 (Methylcobalamin) cannot absorb cyanocobalamin.     Omega-3 Fatty Acids (OMEGA-3 FISH OIL PO) Take 1,200 g by mouth 2 times daily      primaquine 26.3 MG tablet TAKE 2 TABLETS BY MOUTH ONCE DAILY     Probiotic Product (PROBIOTIC PO) Take 1 capsule by mouth every morning     rifampin (RIFADIN) 300 MG capsule TAKE 1 CAPSULE BY MOUTH TWICE DAILY ON AN EMPTY STOMACH     sertraline (ZOLOFT) 100 MG tablet TK 1 T PO QD     terbinafine (LAMISIL) 250 MG tablet Take 1 tablet by mouth daily     valACYclovir (VALTREX) 1000 mg tablet Take 2,000 mg by mouth 2 times daily as needed (for 1 day at onset for cold sores.)      vitamin B complex with vitamin C (VITAMIN  B COMPLEX) TABS tablet Take 1 tablet by mouth daily     No current facility-administered medications for this visit.      Allergies   Allergen Reactions     Florinef [Fludrocortisone] Swelling            Levaquin [Levofloxacin] Itching and Swelling     Swelling lips and face       Norpace [Disopyramide] Swelling       ROS:  12 point review of systems negative other than symptoms  noted below.    OBJECTIVE:     /60   Wt 66.2 kg (146 lb)   Breastfeeding? No   BMI 25.46 kg/m    Body mass index is 25.46 kg/m .    Exam:  Constitutional:  Appearance: Well nourished, well developed alert, in no acute distress  Neck:  Lymph Nodes:  No lymphadenopathy present; Thyroid:  Gland size normal, nontender, no nodules or masses present on palpation  Chest:  Respiratory Effort:  Breathing unlabored. Clear to auscultation bilaterally.   Cardiovascular: Heart: Auscultation:  Regular rate, normal rhythm, no murmurs present  Gastrointestinal:  Abdominal Examination:  Abdomen nontender to palpation, tone normal without rigidity or guarding, no masses present, umbilicus without lesions; Liver/Spleen:  No hepatomegaly present, liver nontender to palpation; Hernias:  No hernias present  Lymphatic: Lymph Nodes:  No other lymphadenopathy present  Skin: General Inspection:  No rashes present, no lesions present, no areas of discoloration.  Neurologic:  Mental Status:  Oriented X3.  Normal strength and tone, sensory exam grossly normal, mentation intact and speech normal.    Psychiatric:  Mentation appears normal and affect normal/bright.  No Pelvic Exam performed     In-Clinic Test Results: Hemoglobin pending      ASSESSMENT/PLAN:                                                        Patient cleared for general anesthesia for laparoscopic approach for cervical trachelectomy.  The patient will be in the hospital one night and off of work for 2 weeks.  She understands that she will not be able to do any lifting for 6 weeks.  She understands the risks and complications of the procedure including general anesthesia blood loss infection injury to bowel bladder ureters and major vessels necessitating further surgery.        Francisco Churchill MD  [facility]

## 2019-11-12 ENCOUNTER — ANESTHESIA EVENT (OUTPATIENT)
Dept: SURGERY | Facility: CLINIC | Age: 49
End: 2019-11-12
Payer: COMMERCIAL

## 2019-11-12 ENCOUNTER — HOSPITAL ENCOUNTER (OUTPATIENT)
Facility: CLINIC | Age: 49
Discharge: HOME | End: 2019-11-13
Attending: OBSTETRICS & GYNECOLOGY | Admitting: OBSTETRICS & GYNECOLOGY
Payer: COMMERCIAL

## 2019-11-12 ENCOUNTER — ANESTHESIA (OUTPATIENT)
Dept: SURGERY | Facility: CLINIC | Age: 49
End: 2019-11-12
Payer: COMMERCIAL

## 2019-11-12 ENCOUNTER — SURGERY (OUTPATIENT)
Age: 49
End: 2019-11-12
Payer: COMMERCIAL

## 2019-11-12 DIAGNOSIS — N87.9 CERVICAL DYSPLASIA: ICD-10-CM

## 2019-11-12 PROCEDURE — 25800030 ZZH RX IP 258 OP 636: Performed by: NURSE ANESTHETIST, CERTIFIED REGISTERED

## 2019-11-12 PROCEDURE — 37000009 ZZH ANESTHESIA TECHNICAL FEE, EACH ADDTL 15 MIN: Performed by: OBSTETRICS & GYNECOLOGY

## 2019-11-12 PROCEDURE — 25000128 H RX IP 250 OP 636: Performed by: NURSE ANESTHETIST, CERTIFIED REGISTERED

## 2019-11-12 PROCEDURE — 25000128 H RX IP 250 OP 636: Performed by: OBSTETRICS & GYNECOLOGY

## 2019-11-12 PROCEDURE — 27210794 ZZH OR GENERAL SUPPLY STERILE: Performed by: OBSTETRICS & GYNECOLOGY

## 2019-11-12 PROCEDURE — 25000125 ZZHC RX 250: Performed by: OBSTETRICS & GYNECOLOGY

## 2019-11-12 PROCEDURE — 25000566 ZZH SEVOFLURANE, EA 15 MIN: Performed by: OBSTETRICS & GYNECOLOGY

## 2019-11-12 PROCEDURE — 36000056 ZZH SURGERY LEVEL 3 1ST 30 MIN: Performed by: OBSTETRICS & GYNECOLOGY

## 2019-11-12 PROCEDURE — 25000132 ZZH RX MED GY IP 250 OP 250 PS 637: Performed by: ANESTHESIOLOGY

## 2019-11-12 PROCEDURE — 25000125 ZZHC RX 250: Performed by: NURSE ANESTHETIST, CERTIFIED REGISTERED

## 2019-11-12 PROCEDURE — 88307 TISSUE EXAM BY PATHOLOGIST: CPT | Mod: 26 | Performed by: OBSTETRICS & GYNECOLOGY

## 2019-11-12 PROCEDURE — 40000935 ZZH STATISTIC OUTPATIENT (NON-OBS) EVE

## 2019-11-12 PROCEDURE — 40000936 ZZH STATISTIC OUTPATIENT (NON-OBS) NIGHT

## 2019-11-12 PROCEDURE — 40000170 ZZH STATISTIC PRE-PROCEDURE ASSESSMENT II: Performed by: OBSTETRICS & GYNECOLOGY

## 2019-11-12 PROCEDURE — 36000058 ZZH SURGERY LEVEL 3 EA 15 ADDTL MIN: Performed by: OBSTETRICS & GYNECOLOGY

## 2019-11-12 PROCEDURE — 40000934 ZZH STATISTIC OUTPATIENT (NON-OBS) DAY

## 2019-11-12 PROCEDURE — 25800025 ZZH RX 258: Performed by: OBSTETRICS & GYNECOLOGY

## 2019-11-12 PROCEDURE — 37000008 ZZH ANESTHESIA TECHNICAL FEE, 1ST 30 MIN: Performed by: OBSTETRICS & GYNECOLOGY

## 2019-11-12 PROCEDURE — 57550 REMOVAL OF RESIDUAL CERVIX: CPT | Performed by: OBSTETRICS & GYNECOLOGY

## 2019-11-12 PROCEDURE — 88307 TISSUE EXAM BY PATHOLOGIST: CPT | Performed by: OBSTETRICS & GYNECOLOGY

## 2019-11-12 PROCEDURE — 25000132 ZZH RX MED GY IP 250 OP 250 PS 637: Performed by: OBSTETRICS & GYNECOLOGY

## 2019-11-12 PROCEDURE — 71000012 ZZH RECOVERY PHASE 1 LEVEL 1 FIRST HR: Performed by: OBSTETRICS & GYNECOLOGY

## 2019-11-12 PROCEDURE — 25000125 ZZHC RX 250: Performed by: ANESTHESIOLOGY

## 2019-11-12 RX ORDER — OXYCODONE AND ACETAMINOPHEN 5; 325 MG/1; MG/1
1-2 TABLET ORAL EVERY 4 HOURS PRN
Qty: 20 TABLET | Refills: 0 | Status: SHIPPED | OUTPATIENT
Start: 2019-11-12

## 2019-11-12 RX ORDER — ONDANSETRON 2 MG/ML
4 INJECTION INTRAMUSCULAR; INTRAVENOUS EVERY 30 MIN PRN
Status: DISCONTINUED | OUTPATIENT
Start: 2019-11-12 | End: 2019-11-12 | Stop reason: HOSPADM

## 2019-11-12 RX ORDER — FENTANYL CITRATE 50 UG/ML
25-50 INJECTION, SOLUTION INTRAMUSCULAR; INTRAVENOUS
Status: DISCONTINUED | OUTPATIENT
Start: 2019-11-12 | End: 2019-11-12 | Stop reason: HOSPADM

## 2019-11-12 RX ORDER — VENLAFAXINE HYDROCHLORIDE 150 MG/1
150 CAPSULE, EXTENDED RELEASE ORAL DAILY
COMMUNITY

## 2019-11-12 RX ORDER — MAGNESIUM HYDROXIDE 1200 MG/15ML
LIQUID ORAL PRN
Status: DISCONTINUED | OUTPATIENT
Start: 2019-11-12 | End: 2019-11-12 | Stop reason: HOSPADM

## 2019-11-12 RX ORDER — NALOXONE HYDROCHLORIDE 0.4 MG/ML
.1-.4 INJECTION, SOLUTION INTRAMUSCULAR; INTRAVENOUS; SUBCUTANEOUS
Status: DISCONTINUED | OUTPATIENT
Start: 2019-11-12 | End: 2019-11-13 | Stop reason: HOSPADM

## 2019-11-12 RX ORDER — PROCHLORPERAZINE MALEATE 10 MG
10 TABLET ORAL EVERY 6 HOURS PRN
Status: DISCONTINUED | OUTPATIENT
Start: 2019-11-12 | End: 2019-11-13 | Stop reason: HOSPADM

## 2019-11-12 RX ORDER — CEFAZOLIN SODIUM 2 G/100ML
2 INJECTION, SOLUTION INTRAVENOUS
Status: COMPLETED | OUTPATIENT
Start: 2019-11-12 | End: 2019-11-12

## 2019-11-12 RX ORDER — PROPOFOL 10 MG/ML
INJECTION, EMULSION INTRAVENOUS CONTINUOUS PRN
Status: DISCONTINUED | OUTPATIENT
Start: 2019-11-12 | End: 2019-11-12

## 2019-11-12 RX ORDER — OXYCODONE AND ACETAMINOPHEN 5; 325 MG/1; MG/1
1-2 TABLET ORAL EVERY 4 HOURS PRN
Status: DISCONTINUED | OUTPATIENT
Start: 2019-11-12 | End: 2019-11-13 | Stop reason: HOSPADM

## 2019-11-12 RX ORDER — ACETAMINOPHEN 325 MG/1
975 TABLET ORAL ONCE
Status: COMPLETED | OUTPATIENT
Start: 2019-11-12 | End: 2019-11-12

## 2019-11-12 RX ORDER — PROPOFOL 10 MG/ML
INJECTION, EMULSION INTRAVENOUS PRN
Status: DISCONTINUED | OUTPATIENT
Start: 2019-11-12 | End: 2019-11-12

## 2019-11-12 RX ORDER — HYDROMORPHONE HYDROCHLORIDE 1 MG/ML
.3-.5 INJECTION, SOLUTION INTRAMUSCULAR; INTRAVENOUS; SUBCUTANEOUS EVERY 10 MIN PRN
Status: DISCONTINUED | OUTPATIENT
Start: 2019-11-12 | End: 2019-11-12 | Stop reason: HOSPADM

## 2019-11-12 RX ORDER — SENNOSIDES 8.6 MG
2 TABLET ORAL 2 TIMES DAILY
Status: DISCONTINUED | OUTPATIENT
Start: 2019-11-12 | End: 2019-11-13 | Stop reason: HOSPADM

## 2019-11-12 RX ORDER — ONDANSETRON 2 MG/ML
4 INJECTION INTRAMUSCULAR; INTRAVENOUS EVERY 6 HOURS PRN
Status: DISCONTINUED | OUTPATIENT
Start: 2019-11-12 | End: 2019-11-13 | Stop reason: HOSPADM

## 2019-11-12 RX ORDER — NALOXONE HYDROCHLORIDE 0.4 MG/ML
.1-.4 INJECTION, SOLUTION INTRAMUSCULAR; INTRAVENOUS; SUBCUTANEOUS
Status: DISCONTINUED | OUTPATIENT
Start: 2019-11-12 | End: 2019-11-12 | Stop reason: HOSPADM

## 2019-11-12 RX ORDER — KETOROLAC TROMETHAMINE 30 MG/ML
INJECTION, SOLUTION INTRAMUSCULAR; INTRAVENOUS PRN
Status: DISCONTINUED | OUTPATIENT
Start: 2019-11-12 | End: 2019-11-12

## 2019-11-12 RX ORDER — SODIUM CHLORIDE, SODIUM LACTATE, POTASSIUM CHLORIDE, CALCIUM CHLORIDE 600; 310; 30; 20 MG/100ML; MG/100ML; MG/100ML; MG/100ML
INJECTION, SOLUTION INTRAVENOUS CONTINUOUS
Status: DISCONTINUED | OUTPATIENT
Start: 2019-11-12 | End: 2019-11-12 | Stop reason: HOSPADM

## 2019-11-12 RX ORDER — ONDANSETRON 4 MG/1
4 TABLET, ORALLY DISINTEGRATING ORAL EVERY 30 MIN PRN
Status: DISCONTINUED | OUTPATIENT
Start: 2019-11-12 | End: 2019-11-12 | Stop reason: HOSPADM

## 2019-11-12 RX ORDER — LIDOCAINE HYDROCHLORIDE 20 MG/ML
INJECTION, SOLUTION INFILTRATION; PERINEURAL PRN
Status: DISCONTINUED | OUTPATIENT
Start: 2019-11-12 | End: 2019-11-12

## 2019-11-12 RX ORDER — SODIUM CHLORIDE, SODIUM LACTATE, POTASSIUM CHLORIDE, CALCIUM CHLORIDE 600; 310; 30; 20 MG/100ML; MG/100ML; MG/100ML; MG/100ML
INJECTION, SOLUTION INTRAVENOUS CONTINUOUS PRN
Status: DISCONTINUED | OUTPATIENT
Start: 2019-11-12 | End: 2019-11-12

## 2019-11-12 RX ORDER — SCOLOPAMINE TRANSDERMAL SYSTEM 1 MG/1
1 PATCH, EXTENDED RELEASE TRANSDERMAL ONCE
Status: COMPLETED | OUTPATIENT
Start: 2019-11-12 | End: 2019-11-12

## 2019-11-12 RX ORDER — ONDANSETRON 4 MG/1
4 TABLET, ORALLY DISINTEGRATING ORAL EVERY 6 HOURS PRN
Status: DISCONTINUED | OUTPATIENT
Start: 2019-11-12 | End: 2019-11-13 | Stop reason: HOSPADM

## 2019-11-12 RX ORDER — MEPERIDINE HYDROCHLORIDE 25 MG/ML
12.5 INJECTION INTRAMUSCULAR; INTRAVENOUS; SUBCUTANEOUS
Status: DISCONTINUED | OUTPATIENT
Start: 2019-11-12 | End: 2019-11-12 | Stop reason: HOSPADM

## 2019-11-12 RX ORDER — HEPARIN SODIUM 1000 [USP'U]/ML
INJECTION, SOLUTION INTRAVENOUS; SUBCUTANEOUS
Status: DISCONTINUED
Start: 2019-11-12 | End: 2019-11-12 | Stop reason: HOSPADM

## 2019-11-12 RX ORDER — HYDROMORPHONE HYDROCHLORIDE 1 MG/ML
0.2 INJECTION, SOLUTION INTRAMUSCULAR; INTRAVENOUS; SUBCUTANEOUS
Status: DISCONTINUED | OUTPATIENT
Start: 2019-11-12 | End: 2019-11-13 | Stop reason: HOSPADM

## 2019-11-12 RX ORDER — FENTANYL CITRATE 50 UG/ML
INJECTION, SOLUTION INTRAMUSCULAR; INTRAVENOUS PRN
Status: DISCONTINUED | OUTPATIENT
Start: 2019-11-12 | End: 2019-11-12

## 2019-11-12 RX ORDER — CEFAZOLIN SODIUM 2 G/100ML
2 INJECTION, SOLUTION INTRAVENOUS EVERY 8 HOURS
Status: COMPLETED | OUTPATIENT
Start: 2019-11-12 | End: 2019-11-13

## 2019-11-12 RX ORDER — CEFAZOLIN SODIUM 1 G/3ML
1 INJECTION, POWDER, FOR SOLUTION INTRAMUSCULAR; INTRAVENOUS SEE ADMIN INSTRUCTIONS
Status: DISCONTINUED | OUTPATIENT
Start: 2019-11-12 | End: 2019-11-12 | Stop reason: HOSPADM

## 2019-11-12 RX ORDER — ONDANSETRON 2 MG/ML
INJECTION INTRAMUSCULAR; INTRAVENOUS PRN
Status: DISCONTINUED | OUTPATIENT
Start: 2019-11-12 | End: 2019-11-12

## 2019-11-12 RX ORDER — LIDOCAINE 40 MG/G
CREAM TOPICAL
Status: DISCONTINUED | OUTPATIENT
Start: 2019-11-12 | End: 2019-11-13 | Stop reason: HOSPADM

## 2019-11-12 RX ADMIN — PROPOFOL 150 MCG/KG/MIN: 10 INJECTION, EMULSION INTRAVENOUS at 08:19

## 2019-11-12 RX ADMIN — CEFAZOLIN SODIUM 2 G: 2 INJECTION, SOLUTION INTRAVENOUS at 16:51

## 2019-11-12 RX ADMIN — LIDOCAINE HYDROCHLORIDE 10 ML: 10; .005 INJECTION, SOLUTION EPIDURAL; INFILTRATION; INTRACAUDAL; PERINEURAL at 08:56

## 2019-11-12 RX ADMIN — FENTANYL CITRATE 50 MCG: 50 INJECTION, SOLUTION INTRAMUSCULAR; INTRAVENOUS at 08:16

## 2019-11-12 RX ADMIN — SODIUM CHLORIDE 1000 ML: 900 IRRIGANT IRRIGATION at 08:36

## 2019-11-12 RX ADMIN — OXYCODONE HYDROCHLORIDE AND ACETAMINOPHEN 2 TABLET: 5; 325 TABLET ORAL at 19:45

## 2019-11-12 RX ADMIN — OXYCODONE HYDROCHLORIDE AND ACETAMINOPHEN 1 TABLET: 5; 325 TABLET ORAL at 14:55

## 2019-11-12 RX ADMIN — PHENYLEPHRINE HYDROCHLORIDE 100 MCG: 10 INJECTION INTRAVENOUS at 08:35

## 2019-11-12 RX ADMIN — CEFAZOLIN SODIUM 2 G: 2 INJECTION, SOLUTION INTRAVENOUS at 08:21

## 2019-11-12 RX ADMIN — ACETAMINOPHEN 975 MG: 325 TABLET, FILM COATED ORAL at 07:18

## 2019-11-12 RX ADMIN — DEXTROSE AND SODIUM CHLORIDE: 5; 450 INJECTION, SOLUTION INTRAVENOUS at 13:07

## 2019-11-12 RX ADMIN — MIDAZOLAM 2 MG: 1 INJECTION INTRAMUSCULAR; INTRAVENOUS at 08:16

## 2019-11-12 RX ADMIN — FENTANYL CITRATE 50 MCG: 50 INJECTION, SOLUTION INTRAMUSCULAR; INTRAVENOUS at 08:32

## 2019-11-12 RX ADMIN — KETOROLAC TROMETHAMINE 30 MG: 30 INJECTION, SOLUTION INTRAMUSCULAR at 08:52

## 2019-11-12 RX ADMIN — ONDANSETRON 4 MG: 2 INJECTION INTRAMUSCULAR; INTRAVENOUS at 08:34

## 2019-11-12 RX ADMIN — SENNOSIDES 2 TABLET: 8.6 TABLET, FILM COATED ORAL at 19:41

## 2019-11-12 RX ADMIN — SODIUM CHLORIDE, POTASSIUM CHLORIDE, SODIUM LACTATE AND CALCIUM CHLORIDE: 600; 310; 30; 20 INJECTION, SOLUTION INTRAVENOUS at 08:10

## 2019-11-12 RX ADMIN — LIDOCAINE HYDROCHLORIDE 100 MG: 20 INJECTION, SOLUTION INFILTRATION; PERINEURAL at 08:16

## 2019-11-12 RX ADMIN — PROPOFOL 200 MG: 10 INJECTION, EMULSION INTRAVENOUS at 08:16

## 2019-11-12 RX ADMIN — SCOPALAMINE 1 PATCH: 1 PATCH, EXTENDED RELEASE TRANSDERMAL at 07:18

## 2019-11-12 ASSESSMENT — ENCOUNTER SYMPTOMS
ORTHOPNEA: 0
SEIZURES: 0

## 2019-11-12 ASSESSMENT — MIFFLIN-ST. JEOR: SCORE: 1277.71

## 2019-11-12 NOTE — ANESTHESIA PREPROCEDURE EVALUATION
Anesthesia Pre-Procedure Evaluation    Patient: Cori Gregory   MRN: [id number] : 1970          Preoperative Diagnosis: Cervical dysplasia [N87.9]    Procedure(s):  CERVICAL TRACHELECTOMY    Past Medical History:   Diagnosis Date     Babesiosis 10/2017     Hyperprolactinemia (H)     secreting tumor     Hypothyroid      Lyme disease 2017     Menopause      Osteopenia      Pacemaker 10/2014    pacemaker     PONV (postoperative nausea and vomiting)      Uterine myoma      Past Surgical History:   Procedure Laterality Date     CARDIAC SURGERY      pacemaker     COLONOSCOPY       D & C       HYSTEROSCOPY, LAPAROSCOPY, COMBINED  2006    septum resection     LAPAROSCOPIC ASSISTED HYSTERECTOMY VAGINAL, BILATERAL SALPINGO-OOPHORECTOMY, COMBINED N/A 2016    Procedure: COMBINED LAPAROSCOPIC ASSISTED HYSTERECTOMY VAGINAL, SALPINGO-OOPHORECTOMY;  Surgeon: Francisco Churchill MD;  Location: [facility]     LAPAROSCOPY      minor endo     LEEP TX, CERVICAL  1994     SINUS SURGERY         Anesthesia Evaluation     . Pt has had prior anesthetic.     History of anesthetic complications   - PONV and motion sickness        ROS/MED HX    ENT/Pulmonary:  - neg pulmonary ROS    (-) sleep apnea   Neurologic:  - neg neurologic ROS    (-) seizures, CVA and migraines   Cardiovascular: Comment: Pacemaker - 10/2018 - normal parameters, 6.5 years battery life, 9% V paced, 28 percent A paced.      (+) ----. : . . . pacemaker Reason placed: mobitz 2 heart block:. .      (-) CHF and orthopnea/PND   METS/Exercise Tolerance:     Hematologic:  - neg hematologic  ROS       Musculoskeletal: Comment: osteopenia        GI/Hepatic:  - neg GI/hepatic ROS      (-) GERD   Renal/Genitourinary: Comment: Stress incontinence        Endo: Comment: Hyperprolactinemia     (+) thyroid problem hypothyroidism, .      Psychiatric:  - neg psychiatric ROS       Infectious Disease:  - neg infectious disease ROS       Malignancy:      - no  "malignancy   Other: Comment: Cervical dysplasia    Babesiosis  Lyme disease    Uterine myoma                         Physical Exam  Normal systems: cardiovascular, pulmonary and dental    Airway   Mallampati: II  TM distance: >3 FB  Neck ROM: full    Dental     Cardiovascular   Rhythm and rate: regular and normal      Pulmonary    breath sounds clear to auscultation            Lab Results   Component Value Date    HGB 12.5 11/11/2019    HCG Negative 12/27/2016       Preop Vitals  BP Readings from Last 3 Encounters:   11/11/19 108/60   09/16/19 112/68   06/14/18 104/60    Pulse Readings from Last 3 Encounters:   06/14/18 68   12/05/16 84      Resp Readings from Last 3 Encounters:   12/28/16 16    SpO2 Readings from Last 3 Encounters:   12/28/16 97%      Temp Readings from Last 1 Encounters:   12/28/16 37.2  C (99  F) (Oral)    Ht Readings from Last 1 Encounters:   09/16/19 1.613 m (5' 3.5\")      Wt Readings from Last 1 Encounters:   11/11/19 66.2 kg (146 lb)    Estimated body mass index is 25.46 kg/m  as calculated from the following:    Height as of 9/16/19: 1.613 m (5' 3.5\").    Weight as of 11/11/19: 66.2 kg (146 lb).       Anesthesia Plan      History & Physical Review  History and physical reviewed and following examination; no interval change.    ASA Status:  3 .    NPO Status:  > 8 hours    Plan for General and ETT with Propofol induction. Maintenance will be TIVA.    PONV prophylaxis:  Ondansetron (or other 5HT-3), Dexamethasone or Solumedrol and Scopolamine patch  Propofol infusion  Have magnet available      Postoperative Care  Postoperative pain management:  IV analgesics and Oral pain medications.      Consents  Anesthetic plan, risks, benefits and alternatives discussed with:  Patient..                 Eric Caldwell MD  "

## 2019-11-12 NOTE — OR NURSING
Dr. Caldwell, VERNON, reviewed interrogation report.  He noted that no action was required r/t the medtronic pacemaker.

## 2019-11-12 NOTE — ANESTHESIA CARE TRANSFER NOTE
Patient: Cori Gregory    Procedure(s):  CERVICAL TRACHELECTOMY    Diagnosis: Cervical dysplasia [N87.9]  Diagnosis Additional Information: No value filed.    Anesthesia Type:   General, ETT     Note:  Airway :Face Mask  Patient transferred to:PACU  Handoff Report: Identifed the Patient, Identified the Reponsible Provider, Reviewed the pertinent medical history, Discussed the surgical course, Reviewed Intra-OP anesthesia mangement and issues during anesthesia, Set expectations for post-procedure period and Allowed opportunity for questions and acknowledgement of understanding      Vitals: (Last set prior to Anesthesia Care Transfer)    CRNA VITALS  11/12/2019 0828 - 11/12/2019 0907      11/12/2019             Resp Rate (set):  10                Electronically Signed By: MINH Salcedo CRNA  November 12, 2019  9:07 AM

## 2019-11-12 NOTE — ANESTHESIA POSTPROCEDURE EVALUATION
Patient: Cori Gregory    Procedure(s):  CERVICAL TRACHELECTOMY    Diagnosis:Cervical dysplasia [N87.9]  Diagnosis Additional Information: No value filed.    Anesthesia Type:  General, ETT    Note:  Anesthesia Post Evaluation    Patient location during evaluation: PACU  Patient participation: Able to fully participate in evaluation  Level of consciousness: awake  Pain management: adequate  Airway patency: patent  Cardiovascular status: acceptable  Respiratory status: acceptable  Hydration status: acceptable  PONV: none             Last vitals:  Vitals:    11/12/19 1115 11/12/19 1132 11/12/19 1153   BP: 100/62  100/56   Pulse: 77     Resp: 14  23   Temp:   36.9  C (98.5  F)   SpO2: 96% 98% 97%         Electronically Signed By: Eric Caldwell MD  November 12, 2019  12:31 PM

## 2019-11-12 NOTE — BRIEF OP NOTE
[facility] Brief Operative Note    Pre-operative diagnosis: Cervical dysplasia [N87.9]   Post-operative diagnosis Cervical dysplasia   Procedure: Procedure(s):  CERVICAL TRACHELECTOMY   Surgeon(s): Surgeon(s) and Role:     * Francisco Churchill MD - Primary   Estimated blood loss: 5 cc   Specimens: ID Type Source Tests Collected by Time Destination   A : Cervix Tissue Cervix SURGICAL PATHOLOGY EXAM Francisco Churchill MD 11/12/2019  8:40 AM       Findings: Abnormal shaped cervix

## 2019-11-12 NOTE — OP NOTE
Procedure Date: 2019      PREOPERATIVE DIAGNOSIS:  Cervical dysplasia.      POSTOPERATIVE DIAGNOSIS:  Cervical dysplasia.      PROCEDURE:  Cervical trachelectomy.        OPERATIVE PROCEDURE:  After general anesthesia was induced, the patient was placed in the dorsal lithotomy position and prepped and draped in the usual fashion.  Examination showed a scarred cervical stump from previous surgery.  Lidocaine was injected circumferentially around the cervix.  A circumferential incision was made.  The posterior cul-de-sac was entered and a long billed speculum placed.  The uterosacral ligaments were clamped, cut and tied bilaterally and held for closure.  The cervical stump was excised and sent to the pathologist.  The defect was closed with interrupted 0 Vicryl sutures in a transverse fashion.  The estimated blood loss was 5 mL.  The patient tolerated this well and went to the recovery room in satisfactory condition.  She will be observed overnight and sent home in the morning.  She is given Percocet as needed for pain.  She received Toradol in the recovery room for immediate postoperative discomfort.         MATHEW NJ JR, MD             D: 2019   T: 2019   MT: VERONIQUE      Name:     BRIGIDO DIAZ   MRN:      [id number]        Account:        [id number]   :      1970           Procedure Date: 2019      Document: [id number]

## 2019-11-12 NOTE — PLAN OF CARE
A&Ox4. VSS on RA, Capno WNL. IVF infusing. SBA to bathroom. PCDs on. Pain managed w/ Percocet. Denies n/t, nausea, SOB, dizziness/lightheadedness. No vag bleeding. Montalvo present, patent and draining. Tolerating regular diet. Will monitor.

## 2019-11-13 VITALS
RESPIRATION RATE: 18 BRPM | WEIGHT: 146.1 LBS | HEART RATE: 67 BPM | HEIGHT: 64 IN | SYSTOLIC BLOOD PRESSURE: 111 MMHG | BODY MASS INDEX: 24.94 KG/M2 | TEMPERATURE: 96.5 F | DIASTOLIC BLOOD PRESSURE: 67 MMHG | OXYGEN SATURATION: 98 %

## 2019-11-13 LAB
COPATH REPORT: NORMAL
GLUCOSE BLDC GLUCOMTR-MCNC: 73 MG/DL (ref 70–99)

## 2019-11-13 PROCEDURE — 40000934 ZZH STATISTIC OUTPATIENT (NON-OBS) DAY

## 2019-11-13 PROCEDURE — 82962 GLUCOSE BLOOD TEST: CPT

## 2019-11-13 PROCEDURE — 25000132 ZZH RX MED GY IP 250 OP 250 PS 637: Performed by: OBSTETRICS & GYNECOLOGY

## 2019-11-13 PROCEDURE — 25000128 H RX IP 250 OP 636: Performed by: OBSTETRICS & GYNECOLOGY

## 2019-11-13 RX ADMIN — CEFAZOLIN SODIUM 2 G: 2 INJECTION, SOLUTION INTRAVENOUS at 01:06

## 2019-11-13 RX ADMIN — OXYCODONE HYDROCHLORIDE AND ACETAMINOPHEN 2 TABLET: 5; 325 TABLET ORAL at 00:17

## 2019-11-13 RX ADMIN — SENNOSIDES 2 TABLET: 8.6 TABLET, FILM COATED ORAL at 08:29

## 2019-11-13 NOTE — PROGRESS NOTES
Pt a&o, up SBA in room, iv fluids stopped, gr cath dc'd per pt request and call to md loren hills'roseanna.  Pt c/o having to have a bm, call and verbal order for scheduled stool softner.  Pt ate well at dinner, regular diet.  Pt pain controlled with prn norco.  Pt no vaginal packing.  Pt to discharge in am, capno off.  Continue to monitor and POC

## 2019-11-13 NOTE — PROGRESS NOTES
Pt. A&OX4. Independent. IV SL. Continent. Tolerating regular diet. Pain controlled with Percocet. New IV was placed after previous fell out. No vaginal bleeding recorded. Plan for patient to discharge today. Continue to monitor.

## 2019-11-13 NOTE — DISCHARGE INSTRUCTIONS
Follow up with Dr Churchill in two weeks. Call his office at [phone] to make an appointment.    No lifting more than 4 lbs for four weeks or until cleared by Dr Churchill    No strenuous activity (No House work, yard work, snow shoveling, ect.) for 4 weeks or until cleared by Dr Churchill

## 2019-11-13 NOTE — PLAN OF CARE
Patient up ad derrick. Denies pain. Tolerating regular diet. No vaginal drainage noted. Bowel sounds active. Passing gas. Abdomin soft, non tender. Lungs clear. On RA. Discharge instructions reviewed with patient. Discharge medications given and explained. All questions answered. IV removed. Patient dressed. Belongings gathered and returned to patient. Patient given wheel chair ride to door 6 where her ride was waiting for her and assumed care.

## 2019-11-18 ENCOUNTER — TELEPHONE (OUTPATIENT)
Dept: OBGYN | Facility: CLINIC | Age: 49
End: 2019-11-18

## 2019-11-18 NOTE — TELEPHONE ENCOUNTER
Pt calling for pathology report from her surgery 11/12/19    Informed will send message to Dr Churchill and he will contact her.    Routing to Dr Churchill to advise.    Lauren Pino RN on 11/18/2019 at 3:20 PM

## 2019-11-21 ENCOUNTER — TELEPHONE (OUTPATIENT)
Dept: OBGYN | Facility: CLINIC | Age: 49
End: 2019-11-21

## 2019-11-21 NOTE — TELEPHONE ENCOUNTER
Dr. Churchill    Pt had cervical trachelectomy with benign pathology on 11/12/19. Guidelines recommend discontinuing pap smears due to removal of cervix and LEEP more than 20 yrs ago. Please advise if okay to discontinue or when you would like next pap.     1994 LEEP  12/27/16 hysterectomy including cervix.  Benign pathology  12/14/17 LSIL/Neg HPV of vaginal cuff. Plan: pap in 6 months  6/14/18 NIL vaginal pap/Neg HPV. Plan: vaginal pap in 1 year  07/22/19 Patient is lost to pap tracking follow-up.   09/16/19 LSIL, Neg HPV of vaginal cuff. Plan.Colposcopy  11/12/19 cervical trachelectomy- benign Plan:     Thanks!  Tara Cuevas, RN, BSN  Pap Tracking

## (undated) DEVICE — PACK MAJOR LITHOTOMY SBA15MLFSD

## (undated) DEVICE — SOL WATER IRRIG 1000ML BOTTLE 2F7114

## (undated) DEVICE — SOL NACL 0.9% IRRIG 1000ML BOTTLE 2F7124

## (undated) DEVICE — PREP SKIN SCRUB TRAY 4461A

## (undated) DEVICE — SU VICRYL 0 CT-1 27" J340H

## (undated) DEVICE — SU VICRYL 0 CT-2 27" J334H

## (undated) DEVICE — NDL SPINAL 22GA 3.5" QUINCKE 405181

## (undated) DEVICE — SUCTION CANISTER MEDIVAC LINER 3000ML W/LID 65651-530

## (undated) DEVICE — GLOVE PROTEXIS W/NEU-THERA 8.0  2D73TE80

## (undated) DEVICE — BLADE CLIPPER 4406

## (undated) DEVICE — LINEN TOWEL PACK X5 5464

## (undated) DEVICE — ESU ELEC BLADE 6" COATED E1450-6

## (undated) DEVICE — SYR 10ML FINGER CONTROL W/O NDL 309695

## (undated) RX ORDER — ONDANSETRON 2 MG/ML
INJECTION INTRAMUSCULAR; INTRAVENOUS
Status: DISPENSED
Start: 2019-11-12

## (undated) RX ORDER — ACETAMINOPHEN 325 MG/1
TABLET ORAL
Status: DISPENSED
Start: 2019-11-12

## (undated) RX ORDER — SCOLOPAMINE TRANSDERMAL SYSTEM 1 MG/1
PATCH, EXTENDED RELEASE TRANSDERMAL
Status: DISPENSED
Start: 2019-11-12

## (undated) RX ORDER — PROPOFOL 10 MG/ML
INJECTION, EMULSION INTRAVENOUS
Status: DISPENSED
Start: 2019-11-12

## (undated) RX ORDER — FENTANYL CITRATE 50 UG/ML
INJECTION, SOLUTION INTRAMUSCULAR; INTRAVENOUS
Status: DISPENSED
Start: 2019-11-12

## (undated) RX ORDER — BUPIVACAINE HYDROCHLORIDE 2.5 MG/ML
INJECTION, SOLUTION EPIDURAL; INFILTRATION; INTRACAUDAL
Status: DISPENSED
Start: 2019-11-12

## (undated) RX ORDER — KETOROLAC TROMETHAMINE 30 MG/ML
INJECTION, SOLUTION INTRAMUSCULAR; INTRAVENOUS
Status: DISPENSED
Start: 2019-11-12

## (undated) RX ORDER — LIDOCAINE HYDROCHLORIDE 10 MG/ML
INJECTION, SOLUTION EPIDURAL; INFILTRATION; INTRACAUDAL; PERINEURAL
Status: DISPENSED
Start: 2019-11-12

## (undated) RX ORDER — CEFAZOLIN SODIUM 2 G/100ML
INJECTION, SOLUTION INTRAVENOUS
Status: DISPENSED
Start: 2019-11-12